# Patient Record
Sex: FEMALE | Race: WHITE | NOT HISPANIC OR LATINO | URBAN - METROPOLITAN AREA
[De-identification: names, ages, dates, MRNs, and addresses within clinical notes are randomized per-mention and may not be internally consistent; named-entity substitution may affect disease eponyms.]

---

## 2023-02-22 ENCOUNTER — TELEPHONE (OUTPATIENT)
Dept: OBGYN CLINIC | Facility: CLINIC | Age: 28
End: 2023-02-22

## 2023-02-22 NOTE — TELEPHONE ENCOUNTER
Pt called and said she is about 19-20 weeks and wants to establish care with us  Pt moved from [facility]  Said she had care one time prior  She would like a call back

## 2023-03-01 NOTE — PROGRESS NOTES
OB/GYN  PRENATAL H&P VISIT (#1 appointment; transfer)  Lucy Herman  3/6/2023  11:07 AM      SUBJECTIVE  Patient is a  at [address] (by [address] [facility]) here for initial prenatal H&P  This pregnancy was not intended but now, desired pregnancy  She is currently doing well  She denies vaginal bleeding, cramping, leakage, abnormal discharge  She is a SAHM  Relevant PMHX: 3rd degree vaginal laceration, obesity  Relevant PSHX: LTCS in 2019; failure to progress    She denies hx of STIs including herpes  She denies denies a hx of TB or close contacts with persons with TB  She has not had MRSA  She denies a personal or family history of inheritable conditions such as physical or intellectual disabilities, birth defects, clotting disorders, heart or neural tube defects  She is planning traveling next week to [place]  Reviewed movement on plane and risk of blood clotting  She denies use of nicotine or recreational drug use  She denies use of ETOH  OB History    Para Term  AB Living   3 2 2 0 0 2   SAB IAB Ectopic Multiple Live Births   0 0 0 0 2      # Outcome Date GA Lbr Matteo/2nd Weight Sex Delivery Anes PTL Lv   3 Current            2 Term  41w3d  4252 g (9 lb 6 oz) F CS-LTranv   TIRSO      Complications: Failure to Progress in Second Stage   1 Term  40w5d  4394 g (9 lb 11 oz) M Vag-Spont EPI  TIRSO       Review of Systems    History reviewed  No pertinent past medical history      Past Surgical History:   Procedure Laterality Date   •  SECTION         Social History     Socioeconomic History   • Marital status: /Civil Union     Spouse name: Not on file   • Number of children: Not on file   • Years of education: Not on file   • Highest education level: Not on file   Occupational History   • Not on file   Tobacco Use   • Smoking status: Never   • Smokeless tobacco: Never   Vaping Use   • Vaping Use: Never used   Substance and Sexual Activity   • Alcohol use: Not Currently   • Drug use: Never   • Sexual activity: Yes     Partners: Male     Birth control/protection: None   Other Topics Concern   • Not on file   Social History Narrative   • Not on file     Social Determinants of Health     Financial Resource Strain: Not on file   Food Insecurity: Not on file   Transportation Needs: Not on file   Physical Activity: Not on file   Stress: Not on file   Social Connections: Not on file   Intimate Partner Violence: Not on file   Housing Stability: Not on file       OBJECTIVE  Vitals:    23 1000   BP: 130/82     Physical Exam  Constitutional:       General: She is not in acute distress  Appearance: Normal appearance  HENT:      Head: Normocephalic  Eyes:      Conjunctiva/sclera: Conjunctivae normal    Cardiovascular:      Rate and Rhythm: Normal rate and regular rhythm  Heart sounds: Normal heart sounds  Pulmonary:      Effort: Pulmonary effort is normal       Breath sounds: Normal breath sounds  Abdominal:      General: Abdomen is flat  Palpations: Abdomen is soft  Tenderness: There is no right CVA tenderness or left CVA tenderness  Musculoskeletal:         General: Normal range of motion  Cervical back: Normal range of motion  Lymphadenopathy:      Cervical: No cervical adenopathy  Neurological:      Mental Status: She is alert and oriented to person, place, and time  Skin:     General: Skin is warm and dry  Psychiatric:         Mood and Affect: Mood normal          Behavior: Behavior normal          Thought Content: Thought content normal    Vitals and nursing note reviewed  ASSESSMENT AND PLAN    28 y o , , with /82 (BP Location: Left arm, Cuff Size: Standard)   Ht 5' 7" (1 702 m)   Wt 93 9 kg (207 lb) , at 21w2d here for her prenatal H&P   by doppler  Fundal height 21 cm  SH noted on dating US on 23; denies vaginal bleeding and cramping    She declined/refused pelvic exam  Pap is UTD from , no hx of abnormal paps  She refused/declined STI testing, including testing off the urine  • H&P completed today  Notable for large babies at term deliveries; 9lbs 6oz and 9lbs 11oz  Hx of LTCS in 2019 with successful  in ; 3rd degree tear  She desires another vaginal delivery  History of blood transfusion due to vaginal tear with last delivery  • HERN[facility]N 07/15/23 by [address] at [facility]  • PN Labs ordered today including prenatal panel, Hep C, HIV  • Reviewed sickle cell, SMA, CF carrier screening  She is checking with insurance coverage prior to completion  • MFM referral placed for genetic testing and ultrasound PRN  • Patient's BMI is 32  • Precautions regarding labor, leakage, bleeding, and fetal movement reviewed  • Return to the office in 4 weeks       GUS Lynch  3/6/2023  11:07 AM

## 2023-03-06 ENCOUNTER — INITIAL PRENATAL (OUTPATIENT)
Dept: OBGYN CLINIC | Facility: CLINIC | Age: 28
End: 2023-03-06

## 2023-03-06 VITALS
DIASTOLIC BLOOD PRESSURE: 82 MMHG | WEIGHT: 207 LBS | BODY MASS INDEX: 32.49 KG/M2 | SYSTOLIC BLOOD PRESSURE: 130 MMHG | HEIGHT: 67 IN

## 2023-03-06 DIAGNOSIS — Z3A.21 21 WEEKS GESTATION OF PREGNANCY: Primary | ICD-10-CM

## 2023-03-06 PROBLEM — Z98.891 HISTORY OF C-SECTION: Status: ACTIVE | Noted: 2022-12-14

## 2023-03-06 PROBLEM — O09.291: Status: ACTIVE | Noted: 2022-12-14

## 2023-03-06 RX ORDER — COLOSTRUM, BOVINE 400 MG
250 CAPSULE,DELAYED RELEASE (ENTERIC COATED) ORAL
COMMUNITY

## 2023-03-10 ENCOUNTER — ROUTINE PRENATAL (OUTPATIENT)
Facility: HOSPITAL | Age: 28
End: 2023-03-10

## 2023-03-10 VITALS
WEIGHT: 207.8 LBS | SYSTOLIC BLOOD PRESSURE: 112 MMHG | HEART RATE: 98 BPM | BODY MASS INDEX: 32.62 KG/M2 | HEIGHT: 67 IN | DIASTOLIC BLOOD PRESSURE: 66 MMHG

## 2023-03-10 DIAGNOSIS — Z98.891 HISTORY OF CESAREAN SECTION: ICD-10-CM

## 2023-03-10 DIAGNOSIS — O99.212 MATERNAL OBESITY, ANTEPARTUM, SECOND TRIMESTER: Primary | ICD-10-CM

## 2023-03-10 DIAGNOSIS — Z3A.21 21 WEEKS GESTATION OF PREGNANCY: ICD-10-CM

## 2023-03-10 NOTE — LETTER
March 10, 2023     [address]    Patient: Carlin Solitario   YOB: 1995   Date of Visit: 3/10/2023       Dear Dr Dominica Cockayne: Thank you for referring Carlin Solitario to me for evaluation  Below are my notes for this consultation  If you have questions, please do not hesitate to call me  I look forward to following your patient along with you           Sincerely,        Meaghan Loving MD        CC: No Recipients  Meaghan Loving MD  3/10/2023  1:07 PM  Sign when Signing Visit  Please refer to the [facility] ultrasound report in Ob Procedures for additional information regarding today's visit

## 2023-03-10 NOTE — PROGRESS NOTES
Ultrasound Probe Disinfection    A transvaginal ultrasound was performed  Prior to use, disinfection was performed with High Level Disinfection Process (Trophon)  Probe serial number A4: [id number] was used        Eder Jackson  03/10/23  3:01 PM

## 2023-03-28 ENCOUNTER — ROUTINE PRENATAL (OUTPATIENT)
Dept: OBGYN CLINIC | Facility: CLINIC | Age: 28
End: 2023-03-28

## 2023-03-28 VITALS
SYSTOLIC BLOOD PRESSURE: 114 MMHG | WEIGHT: 210.4 LBS | HEIGHT: 67 IN | DIASTOLIC BLOOD PRESSURE: 72 MMHG | BODY MASS INDEX: 33.02 KG/M2

## 2023-03-28 DIAGNOSIS — Z34.82 ENCOUNTER FOR SUPERVISION OF NORMAL PREGNANCY IN MULTIGRAVIDA IN SECOND TRIMESTER: Primary | ICD-10-CM

## 2023-03-28 PROBLEM — Z92.89 HISTORY OF BLOOD TRANSFUSION: Status: ACTIVE | Noted: 2022-12-14

## 2023-03-28 NOTE — PROGRESS NOTES
VISIT: Denies n/v/HA/cramping/vb/lof/edema/dv/smoking; urine neg/neg  Initial ob labs not done - does not desire STI screening because knows will be negative and concerned about insurance coverage - reviewed if not done, baby will be screened after delivery - will plan to get done but declines genetic carrier screening/thyroid studies; 3/10/23 - level 2 ultrasound done - follow-up growth and anatomy at 32 weeks  PNVs + DHA - tolerating daily  Good FM     Reprinted initial ob labs and included/reviewed 1 hour GTT to get done btw 26-28 weeks  Encourage handwashing and infection prevention  RTO in 4 weeks for routine ob check or sooner if needed

## 2023-04-24 ENCOUNTER — TELEPHONE (OUTPATIENT)
Dept: OBGYN CLINIC | Facility: CLINIC | Age: 28
End: 2023-04-24

## 2023-04-24 ENCOUNTER — APPOINTMENT (OUTPATIENT)
Dept: LAB | Facility: CLINIC | Age: 28
End: 2023-04-24

## 2023-04-24 DIAGNOSIS — Z34.82 ENCOUNTER FOR SUPERVISION OF NORMAL PREGNANCY IN MULTIGRAVIDA IN SECOND TRIMESTER: ICD-10-CM

## 2023-04-24 LAB
ABO GROUP BLD: NORMAL
BACTERIA UR QL AUTO: NORMAL /HPF
BASOPHILS # BLD AUTO: 0.02 THOUSANDS/ΜL (ref 0–0.1)
BASOPHILS NFR BLD AUTO: 0 % (ref 0–1)
BILIRUB UR QL STRIP: NEGATIVE
BLD GP AB SCN SERPL QL: NEGATIVE
CLARITY UR: CLEAR
COLOR UR: COLORLESS
EOSINOPHIL # BLD AUTO: 0.08 THOUSAND/ΜL (ref 0–0.61)
EOSINOPHIL NFR BLD AUTO: 1 % (ref 0–6)
ERYTHROCYTE [DISTWIDTH] IN BLOOD BY AUTOMATED COUNT: 12.4 % (ref 11.6–15.1)
GLUCOSE 1H P 50 G GLC PO SERPL-MCNC: 99 MG/DL (ref 40–134)
GLUCOSE UR STRIP-MCNC: NEGATIVE MG/DL
HCT VFR BLD AUTO: 38.1 % (ref 34.8–46.1)
HGB BLD-MCNC: 12.3 G/DL (ref 11.5–15.4)
HGB UR QL STRIP.AUTO: NEGATIVE
HIV 1+2 AB+HIV1 P24 AG SERPL QL IA: NORMAL
HIV 2 AB SERPL QL IA: NORMAL
HIV1 AB SERPL QL IA: NORMAL
HIV1 P24 AG SERPL QL IA: NORMAL
IMM GRANULOCYTES # BLD AUTO: 0.08 THOUSAND/UL (ref 0–0.2)
IMM GRANULOCYTES NFR BLD AUTO: 1 % (ref 0–2)
KETONES UR STRIP-MCNC: NEGATIVE MG/DL
LEUKOCYTE ESTERASE UR QL STRIP: NEGATIVE
LYMPHOCYTES # BLD AUTO: 1.74 THOUSANDS/ΜL (ref 0.6–4.47)
LYMPHOCYTES NFR BLD AUTO: 17 % (ref 14–44)
MCH RBC QN AUTO: 30.6 PG (ref 26.8–34.3)
MCHC RBC AUTO-ENTMCNC: 32.3 G/DL (ref 31.4–37.4)
MCV RBC AUTO: 95 FL (ref 82–98)
MONOCYTES # BLD AUTO: 0.66 THOUSAND/ΜL (ref 0.17–1.22)
MONOCYTES NFR BLD AUTO: 6 % (ref 4–12)
NEUTROPHILS # BLD AUTO: 7.93 THOUSANDS/ΜL (ref 1.85–7.62)
NEUTS SEG NFR BLD AUTO: 75 % (ref 43–75)
NITRITE UR QL STRIP: NEGATIVE
NON-SQ EPI CELLS URNS QL MICRO: NORMAL /HPF
NRBC BLD AUTO-RTO: 0 /100 WBCS
PH UR STRIP.AUTO: 7 [PH]
PLATELET # BLD AUTO: 202 THOUSANDS/UL (ref 149–390)
PMV BLD AUTO: 11.4 FL (ref 8.9–12.7)
PROT UR STRIP-MCNC: NEGATIVE MG/DL
RBC # BLD AUTO: 4.02 MILLION/UL (ref 3.81–5.12)
RBC #/AREA URNS AUTO: NORMAL /HPF
RH BLD: POSITIVE
RUBV IGG SERPL IA-ACNC: 57.1 IU/ML
SP GR UR STRIP.AUTO: 1.01 (ref 1–1.03)
TREPONEMA PALLIDUM IGG+IGM AB [PRESENCE] IN SERUM OR PLASMA BY IMMUNOASSAY: NORMAL
UROBILINOGEN UR STRIP-ACNC: <2 MG/DL
VZV IGG SER QL IA: ABNORMAL
WBC # BLD AUTO: 10.51 THOUSAND/UL (ref 4.31–10.16)
WBC #/AREA URNS AUTO: NORMAL /HPF

## 2023-04-24 NOTE — TELEPHONE ENCOUNTER
Pt cancelled her appt tomorrow and said she will wait till her next appt on 5/9  Pt says she is going out of town  I offered appt today at 4:30 with Dr Fatemeh Humphrey and pt refused

## 2023-04-25 LAB
HBV SURFACE AG SER QL: NORMAL
HCV AB SER QL: NORMAL

## 2023-04-26 DIAGNOSIS — R82.71 ASYMPTOMATIC BACTERIURIA DURING PREGNANCY IN THIRD TRIMESTER: Primary | ICD-10-CM

## 2023-04-26 DIAGNOSIS — O99.891 ASYMPTOMATIC BACTERIURIA DURING PREGNANCY IN THIRD TRIMESTER: Primary | ICD-10-CM

## 2023-04-26 LAB — BACTERIA UR CULT: ABNORMAL

## 2023-04-26 RX ORDER — NITROFURANTOIN 25; 75 MG/1; MG/1
100 CAPSULE ORAL 2 TIMES DAILY
Qty: 10 CAPSULE | Refills: 0 | Status: SHIPPED | OUTPATIENT
Start: 2023-04-26 | End: 2023-05-01

## 2023-04-27 LAB
HGB A MFR BLD: 2.7 % (ref 1.8–3.2)
HGB A MFR BLD: 97.3 % (ref 96.4–98.8)
HGB F MFR BLD: 0 % (ref 0–2)
HGB FRACT BLD-IMP: NORMAL
HGB S MFR BLD: 0 %

## 2023-05-08 ENCOUNTER — ROUTINE PRENATAL (OUTPATIENT)
Dept: OBGYN CLINIC | Facility: CLINIC | Age: 28
End: 2023-05-08

## 2023-05-08 VITALS — WEIGHT: 221 LBS | DIASTOLIC BLOOD PRESSURE: 74 MMHG | SYSTOLIC BLOOD PRESSURE: 116 MMHG | BODY MASS INDEX: 34.61 KG/M2

## 2023-05-08 DIAGNOSIS — O34.219 HX SUCCESSFUL VBAC (VAGINAL BIRTH AFTER CESAREAN), CURRENTLY PREGNANT: ICD-10-CM

## 2023-05-08 DIAGNOSIS — O09.293 HX OF MATERNAL LACERATION, 3RD DEGREE, CURRENTLY PREGNANT, THIRD TRIMESTER: Primary | ICD-10-CM

## 2023-05-08 DIAGNOSIS — O34.219 HISTORY OF CESAREAN DELIVERY, CURRENTLY PREGNANT: ICD-10-CM

## 2023-05-08 DIAGNOSIS — Z3A.30 30 WEEKS GESTATION OF PREGNANCY: ICD-10-CM

## 2023-05-08 NOTE — PROGRESS NOTES
33 y o  [id number] female at 30w2d (Estimated Date of Delivery: 7/15/23) for PNV  Pre- Vitals    Flowsheet Row Most Recent Value   Prenatal Assessment    Fetal Heart Rate 145   Movement Present   Prenatal Vitals    Blood Pressure 116/74   Weight - Scale 100 kg (221 lb)   Urine Albumin/Glucose    Dilation/Effacement/Station    Vaginal Drainage    Edema         TWG: Not found  OB Transfer at 30 weeks  Prior obstetric care initially at [facility] and then through [address] [facility]  Prior deliveries occurred in [place], reviewed obstetric history with patient   - prior 1LTCS due to prolonged labor course and failure to progress in first stage  Reports was 10 days past due    - successful  achieved   weighed 9lb 11oz at birth and 3rd degree perineal laceration with delivery  Delivery occurred at 40 3wks   - both prior pregnancies labor started by IOL  Needed cervical ripening for both labor courses  Ida expressed her desire for  and states that she is looking for a practice that is more supportive of her wishes to still go forward with a vaginal delivery  We discussed the larger size of her babies at term and her hx of 3rd degree perineal laceration  We reviewed that she is at increased risk of a 3rd degree occurring with delivery again and we discussed the option for an IOL at 39 weeks to help reduce her risk in the hopes that her baby may be of slightly smaller size than her prior 2 children who delivered past their HERNÁN  With her hx of successful  she does have increased chance of successfully achieving another  with this pregnancy  28 wk labs reviewed  - GTT 99, Hgb 12 3, plts 202  Red folder given and reviewed  Discussed Fetal kick counts, PTL/Labor information, Baby & Me Classes  Consent signed - ok with transfusion, full code  Current visitor policy and support people during labor reviewed  Patient plans to breastfeed     Skin to skin, rooming in, delayed cord clamp, pain management in labor discussed  TDAP was offered and deferred by the patient to next visit  Rhogam was not indicated  Leakage of fluid: no  Vaginal bleeding: no  Contractions/Cramping: no  Fetal movement: yes    RTO in 2 weeks

## 2023-05-08 NOTE — PROGRESS NOTES
Pt is here for her 30w prenatal as a new pt  Pt denies any swelling,leakage,bleeding,pressure, or contractions  Pt would like to discuss her tdap and get it next visit if decided  Breast pump ordered  Pt is looking to have v-alma for this pregnancy   Urine dip neg/neg

## 2023-05-08 NOTE — PATIENT INSTRUCTIONS
Kick Counts in Pregnancy   WHAT YOU NEED TO KNOW:   Kick counts measure how much your baby is moving in your womb  A kick from your baby can be felt as a twist, turn, swish, roll, or jab  It is common to feel your baby kicking at 26 to 28 weeks of pregnancy  You may feel your baby kick as early as 20 weeks of pregnancy  You may want to start counting at 28 weeks  DISCHARGE INSTRUCTIONS:   Contact your doctor immediately if:   You feel a change in the number of kicks or movements of your baby  You feel fewer than 10 kicks within 2 hours  You have questions or concerns about your baby's movements  Why measure kick counts:  Your baby's movement may provide information about your baby's health  He or she may move less, or not at all, if there are problems  Your baby may move less if he or she is not getting enough oxygen or nutrition from the placenta  Do not smoke while you are pregnant  Smoking decreases the amount of oxygen that gets to your baby  Talk to your healthcare provider if you need help to quit smoking  Tell your healthcare provider as soon as you feel a change in your baby's movements  When to measure kick counts:   Measure kick counts at the same time every day  Measure kick counts when your baby is awake and most active  Your baby may be most active in the evening  How to measure kick counts:  Check that your baby is awake before you measure kick counts  You can wake up your baby by lightly pushing on your belly, walking, or drinking something cold  Your healthcare provider may tell you different ways to measure kick counts  You may be told to do the following:  Use a chart or clock to keep track of the time you start and finish counting  Sit in a chair or lie on your left side  Place your hands on the largest part of your belly  Count until you reach 10 kicks  Write down how much time it takes to count 10 kicks  It may take 30 minutes to 2 hours to count 10 kicks  It should not take more than 2 hours to count 10 kicks  Follow up with your doctor as directed:  Write down your questions so you remember to ask them during your visits  © Copyright [employer] 2022 Information is for End User's use only and may not be sold, redistributed or otherwise used for commercial purposes  The above information is an  only  It is not intended as medical advice for individual conditions or treatments  Talk to your doctor, nurse or pharmacist before following any medical regimen to see if it is safe and effective for you

## 2023-05-09 LAB
DME PARACHUTE DELIVERY DATE REQUESTED: NORMAL
DME PARACHUTE ITEM DESCRIPTION: NORMAL
DME PARACHUTE ORDER STATUS: NORMAL
DME PARACHUTE SUPPLIER NAME: NORMAL
DME PARACHUTE SUPPLIER PHONE: NORMAL

## 2023-05-23 ENCOUNTER — ROUTINE PRENATAL (OUTPATIENT)
Dept: OBGYN CLINIC | Facility: CLINIC | Age: 28
End: 2023-05-23

## 2023-05-23 VITALS
WEIGHT: 221 LBS | SYSTOLIC BLOOD PRESSURE: 99 MMHG | DIASTOLIC BLOOD PRESSURE: 58 MMHG | HEIGHT: 67 IN | BODY MASS INDEX: 34.69 KG/M2

## 2023-05-23 DIAGNOSIS — O09.293 HX OF MATERNAL LACERATION, 3RD DEGREE, CURRENTLY PREGNANT, THIRD TRIMESTER: ICD-10-CM

## 2023-05-23 DIAGNOSIS — O34.219 HISTORY OF CESAREAN DELIVERY, CURRENTLY PREGNANT: ICD-10-CM

## 2023-05-23 DIAGNOSIS — Z34.93 PRENATAL CARE IN THIRD TRIMESTER: Primary | ICD-10-CM

## 2023-05-23 NOTE — PROGRESS NOTES
Patient reports good fm, no n/v, headache, cramping, bleeding, loss of fluid, edema, dom violence, or smoking  elsa pnv deciding brand of breast pump, already has hospital paperwork  Would like tdap next visit  Urine neg/neg return in 2 weeks or sooner as needed

## 2023-06-08 ENCOUNTER — ROUTINE PRENATAL (OUTPATIENT)
Dept: OBGYN CLINIC | Facility: CLINIC | Age: 28
End: 2023-06-08
Payer: COMMERCIAL

## 2023-06-08 VITALS — BODY MASS INDEX: 35.87 KG/M2 | DIASTOLIC BLOOD PRESSURE: 72 MMHG | SYSTOLIC BLOOD PRESSURE: 108 MMHG | WEIGHT: 229 LBS

## 2023-06-08 DIAGNOSIS — Z23 NEED FOR TDAP VACCINATION: ICD-10-CM

## 2023-06-08 DIAGNOSIS — Z34.83 ENCOUNTER FOR SUPERVISION OF NORMAL PREGNANCY IN MULTIGRAVIDA IN THIRD TRIMESTER: Primary | ICD-10-CM

## 2023-06-08 PROCEDURE — 90715 TDAP VACCINE 7 YRS/> IM: CPT | Performed by: PHYSICIAN ASSISTANT

## 2023-06-08 PROCEDURE — PNV: Performed by: PHYSICIAN ASSISTANT

## 2023-06-08 PROCEDURE — 90471 IMMUNIZATION ADMIN: CPT | Performed by: PHYSICIAN ASSISTANT

## 2023-06-08 NOTE — PROGRESS NOTES
VISIT: (+) edema - a little in legs when active during the day - notices at night and needs to elevate feet; Denies n/v/HA/cramping/vb/lof/dv/smoking; urine neg/neg  Labs are up to date; Third trimester growth and anatomy scan - not getting done due to cost  PNVs + DHA - not taking - trying to get through diet  Good FM - r/gerson 10 kicks/2 hrs  Tdap - reviewed and encouraged - administered today without issue; Aware GBS at next visit; Desires another  - both prior pregnancies required induction and process was very long - felt like she was scared into moving forward with C/S for first pregnancy; had successful  second pregnancy but was 10 days late and induction process took very long  Desires another  despite the long process    Contraception: plans condoms until ready to have Paragard IUD placed  RTO in 2 weeks for routine ob check or sooner if needed

## 2023-06-20 NOTE — PROGRESS NOTES
OB/GYN  PN Visit  Tho Calderon  [id number]  2023  1:13 PM  Dr Owen Alas MD    S: 33 y o  [id number] 36w4d here for PN visit  She denies contractions  She denies leakage of fluid and vaginal bleeding  She reports good fetal movement  She denies nausea, vomiting, headache, cramping, domestic violence, and smoking  She reports edema  Her pregnancy is complicated by Hx C/S with successful  and varicella NI      O:  Pre-Tasneem Vitals    Flowsheet Row Most Recent Value   Prenatal Assessment    Fetal Heart Rate 130s   Fundal Height (cm) 36 cm   Movement Present   Presentation Vertex   Prenatal Vitals    Blood Pressure 116/72   Weight - Scale 107 kg (235 lb)   Urine Albumin/Glucose    Dilation/Effacement/Station    Vaginal Drainage    Draining Fluid No   Edema    LLE Edema Trace   RLE Edema Trace        Physical Exam  Vitals reviewed  Constitutional:       General: She is not in acute distress  Appearance: Normal appearance  She is well-developed  She is not ill-appearing, toxic-appearing or diaphoretic  Cardiovascular:      Rate and Rhythm: Normal rate  Pulmonary:      Effort: Pulmonary effort is normal  No respiratory distress  Abdominal:      General: There is no distension  Palpations: Abdomen is soft  There is no mass  Tenderness: There is no abdominal tenderness  There is no guarding or rebound  Genitourinary:     Comments: Gravid, nontender  Skin:     General: Skin is warm and dry  Neurological:      Mental Status: She is alert and oriented to person, place, and time     Psychiatric:         Mood and Affect: Mood normal          Behavior: Behavior normal          A/P:    Problem List        Unprioritized    Obesity in pregnancy    Overview     BMI 33 --> 36         History of  delivery, currently pregnant    Overview     CS in 2019 for failure to progress in first stage followed by  in 6097 complicated by 3rd degree laceration  Patient requests TOLAC Hx of maternal laceration, 3rd degree, currently pregnant, third trimester    Overview     Patient has opted for TOLAC         History of blood transfusion    Overview     Pt had blood transfusion with both deliveries  Never had heme work-up    Recommend T&C x2 U on admission and plan for hemorrhage kit in room with aggressive use of tocolytics         Hx successful  (vaginal birth after ), currently pregnant    Overview     Patient hoping for  again  Patient requests IOL at 40 weeks         36 weeks gestation of pregnancy    Current Assessment & Plan     - Continue PNV  - Labor precautions reviewed  - Fetal kick counts reviewed  - Labs: UTD  - Ultrasounds: Most recent US on 3/10/23 wnl (breech presentation); VTX on ultrasound today  - Tdap: Administered 23  - Flu Shot: Will offer in season  - COVID: Vaccinated  - Delivery: TOLAC, considering epidural  - Contraception: Condoms as bridge to Paragard  - Breastfeeding: Yes  - GBS collected  - RTO in 1 week              Future Appointments   Date Time Provider León [place]manisti   2023  3:30 PM Vicky Thomas MD [address]   2023  2:00 PM Rachelle Sandoval MD [address]   2023  3:30 PM Fili Mensah MD [address]   2023 10:30 AM Nhung Landeros PA-C [id number] Shirley Tovar MD  2023  1:13 PM

## 2023-06-21 ENCOUNTER — ROUTINE PRENATAL (OUTPATIENT)
Dept: OBGYN CLINIC | Facility: CLINIC | Age: 28
End: 2023-06-21

## 2023-06-21 VITALS — DIASTOLIC BLOOD PRESSURE: 72 MMHG | BODY MASS INDEX: 36.81 KG/M2 | SYSTOLIC BLOOD PRESSURE: 116 MMHG | WEIGHT: 235 LBS

## 2023-06-21 DIAGNOSIS — O09.293 HX OF MATERNAL LACERATION, 3RD DEGREE, CURRENTLY PREGNANT, THIRD TRIMESTER: Primary | ICD-10-CM

## 2023-06-21 DIAGNOSIS — O99.210 OBESITY IN PREGNANCY: ICD-10-CM

## 2023-06-21 DIAGNOSIS — Z3A.36 36 WEEKS GESTATION OF PREGNANCY: ICD-10-CM

## 2023-06-21 DIAGNOSIS — O34.219 HX SUCCESSFUL VBAC (VAGINAL BIRTH AFTER CESAREAN), CURRENTLY PREGNANT: ICD-10-CM

## 2023-06-21 DIAGNOSIS — Z92.89 HISTORY OF BLOOD TRANSFUSION: ICD-10-CM

## 2023-06-21 DIAGNOSIS — O34.219 HISTORY OF CESAREAN DELIVERY, CURRENTLY PREGNANT: ICD-10-CM

## 2023-06-21 PROCEDURE — PNV: Performed by: OBSTETRICS & GYNECOLOGY

## 2023-06-21 PROCEDURE — 87150 DNA/RNA AMPLIFIED PROBE: CPT | Performed by: OBSTETRICS & GYNECOLOGY

## 2023-06-21 NOTE — ASSESSMENT & PLAN NOTE
- Continue PNV  - Labor precautions reviewed  - Fetal kick counts reviewed  - Labs: UTD  - Ultrasounds: Most recent US on 3/10/23 wnl (breech presentation); VTX on ultrasound today  - Tdap: Administered 6/8/23  - Flu Shot: Will offer in season  - COVID: Vaccinated  - Delivery: TOLAC, considering epidural  - Contraception: Condoms as bridge to Paragard  - Breastfeeding: Yes  - GBS collected  - RTO in 1 week

## 2023-06-23 LAB — GP B STREP DNA SPEC QL NAA+PROBE: NEGATIVE

## 2023-06-30 ENCOUNTER — ROUTINE PRENATAL (OUTPATIENT)
Dept: OBGYN CLINIC | Facility: CLINIC | Age: 28
End: 2023-06-30

## 2023-06-30 VITALS
WEIGHT: 240 LBS | HEIGHT: 67 IN | SYSTOLIC BLOOD PRESSURE: 122 MMHG | BODY MASS INDEX: 37.67 KG/M2 | DIASTOLIC BLOOD PRESSURE: 80 MMHG

## 2023-06-30 DIAGNOSIS — Z3A.37 37 WEEKS GESTATION OF PREGNANCY: Primary | ICD-10-CM

## 2023-06-30 DIAGNOSIS — O34.219 HX SUCCESSFUL VBAC (VAGINAL BIRTH AFTER CESAREAN), CURRENTLY PREGNANT: ICD-10-CM

## 2023-06-30 DIAGNOSIS — O99.210 OBESITY IN PREGNANCY: ICD-10-CM

## 2023-06-30 DIAGNOSIS — O09.293 HX OF MATERNAL LACERATION, 3RD DEGREE, CURRENTLY PREGNANT, THIRD TRIMESTER: ICD-10-CM

## 2023-06-30 DIAGNOSIS — O34.219 HISTORY OF CESAREAN DELIVERY, CURRENTLY PREGNANT: ICD-10-CM

## 2023-06-30 PROCEDURE — PNV: Performed by: STUDENT IN AN ORGANIZED HEALTH CARE EDUCATION/TRAINING PROGRAM

## 2023-06-30 NOTE — PROGRESS NOTES
Juli Barragan is a 33 y o   37w6d  Reports ++FM, no LOF, VB, or regular contractions       Vitals:    23 1500   BP: 122/80   S=D  +FHTs    A/P:  Third tri labs wnl, GBS negative  Rh status POS  TDAP vaccine--received  Contraception: condoms--> ParaGard IUD  Breastfeeding: yes, has pump, working on a new one too  Birth plan: hx of CS-->  (3rd degree), desires TOLAC  If by HERNÁN no labor, would be amenable to induction    Discussed term labor precautions  Return to office in 1 week

## 2023-07-07 ENCOUNTER — ROUTINE PRENATAL (OUTPATIENT)
Dept: OBGYN CLINIC | Facility: CLINIC | Age: 28
End: 2023-07-07

## 2023-07-07 VITALS
DIASTOLIC BLOOD PRESSURE: 78 MMHG | BODY MASS INDEX: 38.39 KG/M2 | SYSTOLIC BLOOD PRESSURE: 122 MMHG | HEIGHT: 67 IN | WEIGHT: 244.6 LBS

## 2023-07-07 DIAGNOSIS — O09.293 HX OF MATERNAL LACERATION, 3RD DEGREE, CURRENTLY PREGNANT, THIRD TRIMESTER: ICD-10-CM

## 2023-07-07 DIAGNOSIS — O34.219 HX SUCCESSFUL VBAC (VAGINAL BIRTH AFTER CESAREAN), CURRENTLY PREGNANT: Primary | ICD-10-CM

## 2023-07-07 DIAGNOSIS — O99.210 OBESITY IN PREGNANCY: ICD-10-CM

## 2023-07-07 DIAGNOSIS — O34.219 HISTORY OF CESAREAN DELIVERY, CURRENTLY PREGNANT: ICD-10-CM

## 2023-07-07 DIAGNOSIS — Z3A.38 38 WEEKS GESTATION OF PREGNANCY: ICD-10-CM

## 2023-07-07 PROCEDURE — PNV: Performed by: STUDENT IN AN ORGANIZED HEALTH CARE EDUCATION/TRAINING PROGRAM

## 2023-07-07 NOTE — PROGRESS NOTES
Epifanio Common is a 34 yo  at 38w6d presenting for routine PNC- denies any CTX, LOF or VB,. Endorses regular FM. Declines cervical exam today. Amendable to being set up for IOL and membrane stripping at next visit if not in labor by then. Desires TOLAC- aware of risk with successful  of a 9lb 11oz baby! Fells this baby is smaller. Labor precautions reviewed- RTO in 1 week.

## 2023-07-07 NOTE — PATIENT INSTRUCTIONS
Pregnancy at 28 to 38 Weeks   AMBULATORY CARE:   Changes happening with your body: You are considered full term at the beginning of 37 weeks. Your breathing may be easier if your baby has moved down into a head-down position. You may need to urinate more often because the baby may be pressing on your bladder. You may also feel more discomfort and get tired easily. Seek care immediately if:   You develop a severe headache that does not go away. You have new or increased vision changes, such as blurred or spotted vision. You have new or increased swelling in your face or hands. You have vaginal spotting or bleeding. Your water broke or you feel warm water gushing or trickling from your vagina. Call your obstetrician if:   You have more than 5 contractions in 1 hour. You notice any changes in your baby's movements. You have abdominal cramps, pressure, or tightening. You have a change in vaginal discharge. You have chills or a fever. You have vaginal itching, burning, or pain. You have yellow, green, white, or foul-smelling vaginal discharge. You have pain or burning when you urinate, less urine than usual, or pink or bloody urine. You have questions or concerns about your condition or care. How to care for yourself at this stage of your pregnancy:       Eat a variety of healthy foods. Healthy foods include fruits, vegetables, whole-grain breads, low-fat dairy foods, beans, lean meats, and fish. Drink liquids as directed. Ask how much liquid to drink each day and which liquids are best for you. Limit caffeine to less than 200 milligrams each day. Limit your intake of fish to 2 servings each week. Choose fish low in mercury such as canned light tuna, shrimp, salmon, cod, or tilapia. Do not  eat fish high in mercury such as swordfish, tilefish, [place] mackerel, and shark. Take prenatal vitamins as directed.   Your need for certain vitamins and minerals, such as folic acid, increases during pregnancy. Prenatal vitamins provide some of the extra vitamins and minerals you need. Prenatal vitamins may also help to decrease the risk of certain birth defects. Rest as needed. Put your feet up if you have swelling in your ankles and feet. Talk to your healthcare provider about exercise. Moderate exercise can help you stay fit. Your healthcare provider will help you plan an exercise program that is safe for you during pregnancy. Do not smoke. Smoking increases your risk of a miscarriage and other health problems during your pregnancy. Smoking can cause your baby to be born early or weigh less at birth. Ask your healthcare provider for information if you need help quitting. Do not drink alcohol. Alcohol passes from your body to your baby through the placenta. It can affect your baby's brain development and cause fetal alcohol syndrome (FAS). FAS is a group of conditions that causes mental, behavior, and growth problems. Talk to your healthcare provider before you take any medicines. Many medicines may harm your baby if you take them when you are pregnant. Do not take any medicines, vitamins, herbs, or supplements without first talking to your healthcare provider. Never use illegal or street drugs (such as marijuana or cocaine) while you are pregnant. Safety tips during pregnancy:   Avoid hot tubs and saunas. Do not use a hot tub or sauna while you are pregnant, especially during your first trimester. Hot tubs and saunas may raise your baby's temperature and increase the risk of birth defects. Avoid toxoplasmosis. This is an infection caused by eating raw meat or being around infected cat feces. It can cause birth defects, miscarriages, and other problems. Wash your hands after you touch raw meat. Make sure any meat is well-cooked before you eat it. Avoid raw eggs and unpasteurized milk.  Use gloves or ask someone else to clean your cat's litter box while you are pregnant. Ask your healthcare provider about travel. The most comfortable time to travel is during the second trimester. Ask your provider if you can travel after 36 weeks. You may not be able to travel in an airplane after 36 weeks. He or she may also recommend you avoid long road trips. Changes happening with your baby:  By 38 weeks, your baby may weigh between 6 and 9 pounds. Your baby may be about 14 inches long from the top of the head to the rump (baby's bottom). Your baby hears well enough to know your voice. As your baby gets larger, you may feel fewer kicks and more stretching and rolling. Your baby may move into a head-down position. Your baby will also rest lower in your abdomen. What you need to know about prenatal care: Your healthcare provider will check your blood pressure and weight. You may also need the following:  A urine test  may also be done to check for sugar and protein. These can be signs of gestational diabetes or infection. Protein in your urine may also be a sign of preeclampsia. Preeclampsia is a condition that can develop during week 20 or later of your pregnancy. It causes high blood pressure, and it can cause problems with your kidneys and other organs. A gestational diabetes screen  may be done. Your healthcare provider may order either a 1-step or 2-step oral glucose tolerance test (OGTT). 1-step OGTT:  Your blood sugar level will be tested after you have not eaten for 8 hours (fasting). You will then be given a glucose drink. Your level will be tested again 1 hour and 2 hours after you finish the drink. 2-step OGTT:  You do not have to fast for the first part of the test. You will have the glucose drink at any time of day. Your blood sugar level will be checked 1 hour later. If your blood sugar is higher than a certain level, another test will be ordered. You will fast and your blood sugar level will be tested. You will have the glucose drink. Your blood will be tested again 1 hour, 2 hours, and 3 hours after you finish the glucose drink. A blood test  may be done to check for anemia (low iron level). A Tdap vaccine  may be recommended by your healthcare provider. A group B strep test  is a test that is done to check for group B strep infection. Group B strep is a type of bacteria that may be found in the vagina or rectum. It can be passed to your baby during delivery if you have it. Your healthcare provider will take swab your vagina or rectum and send the sample to the lab for tests. Fundal height  is a measurement of your uterus to check your baby's growth. This number is usually the same as the number of weeks that you have been pregnant. Your healthcare provider may also check your baby's position. Your baby's heart rate  will be checked. Follow up with your obstetrician as directed:  Write down your questions so you remember to ask them during your visits. © Copyright Carlus Rater 2022 Information is for End User's use only and may not be sold, redistributed or otherwise used for commercial purposes. The above information is an  only. It is not intended as medical advice for individual conditions or treatments. Talk to your doctor, nurse or pharmacist before following any medical regimen to see if it is safe and effective for you. Kick Counts in Pregnancy   WHAT YOU NEED TO KNOW:   Kick counts measure how much your baby is moving in your womb. A kick from your baby can be felt as a twist, turn, swish, roll, or jab. It is common to feel your baby kicking at 26 to 28 weeks of pregnancy. You may feel your baby kick as early as 20 weeks of pregnancy. You may want to start counting at 28 weeks. DISCHARGE INSTRUCTIONS:   Contact your doctor immediately if:   You feel a change in the number of kicks or movements of your baby. You feel fewer than 10 kicks within 2 hours.      You have questions or concerns about your baby's movements. Why measure kick counts:  Your baby's movement may provide information about your baby's health. He or she may move less, or not at all, if there are problems. Your baby may move less if he or she is not getting enough oxygen or nutrition from the placenta. Do not smoke while you are pregnant. Smoking decreases the amount of oxygen that gets to your baby. Talk to your healthcare provider if you need help to quit smoking. Tell your healthcare provider as soon as you feel a change in your baby's movements. When to measure kick counts:   Measure kick counts at the same time every day. Measure kick counts when your baby is awake and most active. Your baby may be most active in the evening. How to measure kick counts:  Check that your baby is awake before you measure kick counts. You can wake up your baby by lightly pushing on your belly, walking, or drinking something cold. Your healthcare provider may tell you different ways to measure kick counts. You may be told to do the following:  Use a chart or clock to keep track of the time you start and finish counting. Sit in a chair or lie on your left side. Place your hands on the largest part of your belly. Count until you reach 10 kicks. Write down how much time it takes to count 10 kicks. It may take 30 minutes to 2 hours to count 10 kicks. It should not take more than 2 hours to count 10 kicks. Follow up with your doctor as directed:  Write down your questions so you remember to ask them during your visits. © Copyright [facility] 2022 Information is for End User's use only and may not be sold, redistributed or otherwise used for commercial purposes. The above information is an  only. It is not intended as medical advice for individual conditions or treatments. Talk to your doctor, nurse or pharmacist before following any medical regimen to see if it is safe and effective for you.

## 2023-07-13 NOTE — PROGRESS NOTES
OB/GYN  PN Visit  Qasim Varma  [id number]  2023  4:09 PM  GUS Grajeda    S: 33 y.o. [id number] 39w5d here for PN visit. Pregnancy complicated by hx of LTCS but desires . She had a successful . OB complaints:  Denies c/o n/v/ha,  no smoking, no DV. No vb/lof  No cramping/ctxns or signs of PTL. She reports slight edema of the feet. L is worse than R. She repots occasional contractions. O:    Pre-Tasneem Vitals    Flowsheet Row Most Recent Value   Prenatal Assessment    Fetal Heart Rate 145   Fundal Height (cm) 40 cm   Movement Present   Prenatal Vitals    Blood Pressure 112/78   Weight - Scale 111 kg (243 lb 12.8 oz)   Urine Albumin/Glucose    Dilation/Effacement/Station    Cervical Dilation 1.5   Cervical Effacement 60   Fetal Station -3   Vaginal Drainage    Draining Fluid No   Edema    LLE Edema Trace   RLE Edema Trace            Gen: no acute distress, nonlabored breathing. OB exam completed: fundal height, +FHT. Urine: -/-    A/P:  #1. 39w5d GESTATION  Labor precautions reviewed  Fetal kick counts reviewed  Tdap: 2023  Labs UTD. Consents signed. Breast pump: +  Pediatrician: +  Contraception: condoms to [facility]. GBS: neg  IOL: desires IOL. Clinical team messaged.     RTC in 1 weeks        GUS Grajeda  2023  4:09 PM

## 2023-07-14 ENCOUNTER — ROUTINE PRENATAL (OUTPATIENT)
Dept: OBGYN CLINIC | Facility: CLINIC | Age: 28
End: 2023-07-14

## 2023-07-14 VITALS
SYSTOLIC BLOOD PRESSURE: 112 MMHG | WEIGHT: 243.8 LBS | HEIGHT: 67 IN | BODY MASS INDEX: 38.27 KG/M2 | DIASTOLIC BLOOD PRESSURE: 78 MMHG

## 2023-07-14 DIAGNOSIS — O34.219 HX SUCCESSFUL VBAC (VAGINAL BIRTH AFTER CESAREAN), CURRENTLY PREGNANT: ICD-10-CM

## 2023-07-14 DIAGNOSIS — O99.210 OBESITY IN PREGNANCY: ICD-10-CM

## 2023-07-14 DIAGNOSIS — O34.219 HISTORY OF CESAREAN DELIVERY, CURRENTLY PREGNANT: ICD-10-CM

## 2023-07-14 DIAGNOSIS — O09.293 HX OF MATERNAL LACERATION, 3RD DEGREE, CURRENTLY PREGNANT, THIRD TRIMESTER: ICD-10-CM

## 2023-07-14 DIAGNOSIS — Z3A.39 39 WEEKS GESTATION OF PREGNANCY: Primary | ICD-10-CM

## 2023-07-14 PROCEDURE — PNV

## 2023-07-17 ENCOUNTER — TELEPHONE (OUTPATIENT)
Dept: OBGYN CLINIC | Facility: CLINIC | Age: 28
End: 2023-07-17

## 2023-07-17 NOTE — TELEPHONE ENCOUNTER
Scheduled with GB, 7/21/23 0700. Pt called and made aware, requesting sooner scheduling aware will check with L&D if sooner availability, none at this time. Staff message sent to provider, added to pink sticky and calendar.

## 2023-07-19 ENCOUNTER — ROUTINE PRENATAL (OUTPATIENT)
Dept: OBGYN CLINIC | Facility: CLINIC | Age: 28
End: 2023-07-19

## 2023-07-19 VITALS
WEIGHT: 245 LBS | SYSTOLIC BLOOD PRESSURE: 106 MMHG | DIASTOLIC BLOOD PRESSURE: 74 MMHG | HEIGHT: 67 IN | BODY MASS INDEX: 38.45 KG/M2

## 2023-07-19 DIAGNOSIS — Z34.83 ENCOUNTER FOR SUPERVISION OF NORMAL PREGNANCY IN MULTIGRAVIDA IN THIRD TRIMESTER: Primary | ICD-10-CM

## 2023-07-19 PROCEDURE — PNV: Performed by: PHYSICIAN ASSISTANT

## 2023-07-19 NOTE — PROGRESS NOTES
VISIT: Denies n/v/HA/cramping/vb/lof/edema/dv/smoking; urine neg/neg; GBS neg  Labs up to date  PNVs + DHA/iron - has not taken in a while  Good FM - r/gerson 10 kicks/2 hrs   Declines cervix check today    Reviewed signs and symptoms of labor and when to call; Reviewed signs and symptoms of pregnancy induced hypertension or preeclampsia and when to call  eIOL scheduled for 7 AM 7/21 - she is ready and hoping to go before then   RTO for postpartum checks or sooner if needed

## 2023-07-21 ENCOUNTER — HOSPITAL ENCOUNTER (OUTPATIENT)
Dept: LABOR AND DELIVERY | Facility: HOSPITAL | Age: 28
Discharge: HOME | End: 2023-07-21
Payer: COMMERCIAL

## 2023-07-21 ENCOUNTER — ANESTHESIA (INPATIENT)
Dept: ANESTHESIOLOGY | Facility: HOSPITAL | Age: 28
End: 2023-07-21
Payer: COMMERCIAL

## 2023-07-21 ENCOUNTER — HOSPITAL ENCOUNTER (INPATIENT)
Facility: HOSPITAL | Age: 28
LOS: 2 days | Discharge: HOME | End: 2023-07-23
Attending: OBSTETRICS & GYNECOLOGY | Admitting: OBSTETRICS & GYNECOLOGY
Payer: COMMERCIAL

## 2023-07-21 ENCOUNTER — ANESTHESIA EVENT (INPATIENT)
Dept: ANESTHESIOLOGY | Facility: HOSPITAL | Age: 28
End: 2023-07-21
Payer: COMMERCIAL

## 2023-07-21 DIAGNOSIS — O34.219 VBAC (VAGINAL BIRTH AFTER CESAREAN): ICD-10-CM

## 2023-07-21 DIAGNOSIS — Z3A.40 40 WEEKS GESTATION OF PREGNANCY: Primary | ICD-10-CM

## 2023-07-21 LAB
ABO GROUP BLD: NORMAL
BLD GP AB SCN SERPL QL: NEGATIVE
ERYTHROCYTE [DISTWIDTH] IN BLOOD BY AUTOMATED COUNT: 13.5 % (ref 11.6–15.1)
HCT VFR BLD AUTO: 34.4 % (ref 34.8–46.1)
HGB BLD-MCNC: 11.1 G/DL (ref 11.5–15.4)
HOLD SPECIMEN: NORMAL
MCH RBC QN AUTO: 29.1 PG (ref 26.8–34.3)
MCHC RBC AUTO-ENTMCNC: 32.3 G/DL (ref 31.4–37.4)
MCV RBC AUTO: 90 FL (ref 82–98)
PLATELET # BLD AUTO: 190 THOUSANDS/UL (ref 149–390)
PMV BLD AUTO: 11.7 FL (ref 8.9–12.7)
RBC # BLD AUTO: 3.81 MILLION/UL (ref 3.81–5.12)
RH BLD: POSITIVE
SPECIMEN EXPIRATION DATE: NORMAL
TREPONEMA PALLIDUM IGG+IGM AB [PRESENCE] IN SERUM OR PLASMA BY IMMUNOASSAY: NORMAL
WBC # BLD AUTO: 11.58 THOUSAND/UL (ref 4.31–10.16)

## 2023-07-21 PROCEDURE — 86900 BLOOD TYPING SEROLOGIC ABO: CPT

## 2023-07-21 PROCEDURE — 86780 TREPONEMA PALLIDUM: CPT

## 2023-07-21 PROCEDURE — 86901 BLOOD TYPING SEROLOGIC RH(D): CPT

## 2023-07-21 PROCEDURE — NC001 PR NO CHARGE: Performed by: OBSTETRICS & GYNECOLOGY

## 2023-07-21 PROCEDURE — 86850 RBC ANTIBODY SCREEN: CPT

## 2023-07-21 PROCEDURE — 85027 COMPLETE CBC AUTOMATED: CPT

## 2023-07-21 RX ORDER — CALCIUM CARBONATE 500 MG/1
500 TABLET, CHEWABLE ORAL DAILY PRN
Status: DISCONTINUED | OUTPATIENT
Start: 2023-07-21 | End: 2023-07-23 | Stop reason: HOSPADM

## 2023-07-21 RX ORDER — ROPIVACAINE HYDROCHLORIDE 2 MG/ML
INJECTION, SOLUTION EPIDURAL; INFILTRATION; PERINEURAL AS NEEDED
Status: DISCONTINUED | OUTPATIENT
Start: 2023-07-21 | End: 2023-07-24 | Stop reason: HOSPADM

## 2023-07-21 RX ORDER — ACETAMINOPHEN 325 MG/1
975 TABLET ORAL EVERY 6 HOURS PRN
Status: DISCONTINUED | OUTPATIENT
Start: 2023-07-21 | End: 2023-07-22

## 2023-07-21 RX ORDER — SODIUM CHLORIDE, SODIUM LACTATE, POTASSIUM CHLORIDE, CALCIUM CHLORIDE 600; 310; 30; 20 MG/100ML; MG/100ML; MG/100ML; MG/100ML
100 INJECTION, SOLUTION INTRAVENOUS CONTINUOUS
Status: DISCONTINUED | OUTPATIENT
Start: 2023-07-21 | End: 2023-07-23 | Stop reason: HOSPADM

## 2023-07-21 RX ORDER — ONDANSETRON 2 MG/ML
4 INJECTION INTRAMUSCULAR; INTRAVENOUS EVERY 6 HOURS PRN
Status: DISCONTINUED | OUTPATIENT
Start: 2023-07-21 | End: 2023-07-22

## 2023-07-21 RX ORDER — SODIUM CHLORIDE, SODIUM LACTATE, POTASSIUM CHLORIDE, CALCIUM CHLORIDE 600; 310; 30; 20 MG/100ML; MG/100ML; MG/100ML; MG/100ML
125 INJECTION, SOLUTION INTRAVENOUS CONTINUOUS
Status: DISCONTINUED | OUTPATIENT
Start: 2023-07-21 | End: 2023-07-23 | Stop reason: HOSPADM

## 2023-07-21 RX ORDER — OXYTOCIN/RINGER'S LACTATE 30/500 ML
1-30 PLASTIC BAG, INJECTION (ML) INTRAVENOUS
Status: DISCONTINUED | OUTPATIENT
Start: 2023-07-21 | End: 2023-07-23 | Stop reason: HOSPADM

## 2023-07-21 RX ORDER — LIDOCAINE HYDROCHLORIDE AND EPINEPHRINE 15; 5 MG/ML; UG/ML
INJECTION, SOLUTION EPIDURAL AS NEEDED
Status: DISCONTINUED | OUTPATIENT
Start: 2023-07-21 | End: 2023-07-24 | Stop reason: HOSPADM

## 2023-07-21 RX ADMIN — LIDOCAINE HYDROCHLORIDE AND EPINEPHRINE 3 ML: 15; 5 INJECTION, SOLUTION EPIDURAL at 20:26

## 2023-07-21 RX ADMIN — SODIUM CHLORIDE, SODIUM LACTATE, POTASSIUM CHLORIDE, AND CALCIUM CHLORIDE 125 ML/HR: .6; .31; .03; .02 INJECTION, SOLUTION INTRAVENOUS at 22:30

## 2023-07-21 RX ADMIN — Medication 2 MILLI-UNITS/MIN: at 08:45

## 2023-07-21 RX ADMIN — ROPIVACAINE HYDROCHLORIDE 5 MG: 2 INJECTION, SOLUTION EPIDURAL; INFILTRATION at 20:32

## 2023-07-21 RX ADMIN — SODIUM CHLORIDE, SODIUM LACTATE, POTASSIUM CHLORIDE, AND CALCIUM CHLORIDE 125 ML/HR: .6; .31; .03; .02 INJECTION, SOLUTION INTRAVENOUS at 17:00

## 2023-07-21 RX ADMIN — SODIUM CHLORIDE, SODIUM LACTATE, POTASSIUM CHLORIDE, AND CALCIUM CHLORIDE 500 ML: .6; .31; .03; .02 INJECTION, SOLUTION INTRAVENOUS at 23:56

## 2023-07-21 RX ADMIN — SODIUM CHLORIDE, SODIUM LACTATE, POTASSIUM CHLORIDE, AND CALCIUM CHLORIDE 125 ML/HR: .6; .31; .03; .02 INJECTION, SOLUTION INTRAVENOUS at 08:45

## 2023-07-21 RX ADMIN — SODIUM CHLORIDE, SODIUM LACTATE, POTASSIUM CHLORIDE, AND CALCIUM CHLORIDE 125 ML/HR: .6; .31; .03; .02 INJECTION, SOLUTION INTRAVENOUS at 21:20

## 2023-07-21 RX ADMIN — ROPIVACAINE HYDROCHLORIDE: 2 INJECTION, SOLUTION EPIDURAL; INFILTRATION at 20:30

## 2023-07-21 RX ADMIN — LIDOCAINE HYDROCHLORIDE AND EPINEPHRINE 2 ML: 15; 5 INJECTION, SOLUTION EPIDURAL at 20:30

## 2023-07-21 NOTE — PLAN OF CARE
Problem: BIRTH - VAGINAL/ SECTION  Goal: Fetal and maternal status remain reassuring during the birth process  Description: INTERVENTIONS:  - Monitor vital signs  - Monitor fetal heart rate  - Monitor uterine activity  - Monitor labor progression (vaginal delivery)  - DVT prophylaxis  - Antibiotic prophylaxis  Outcome: Progressing  Goal: Emotionally satisfying birthing experience for mother/fetus  Description: Interventions:  - Assess, plan, implement and evaluate the nursing care given to the patient in labor  - Advocate the philosophy that each childbirth experience is a unique experience and support the family's chosen level of involvement and control during the labor process   - Actively participate in both the patient's and family's teaching of the birth process  - Consider cultural, Congregation and age-specific factors and plan care for the patient in labor  Outcome: Progressing     Problem: PAIN - ADULT  Goal: Verbalizes/displays adequate comfort level or baseline comfort level  Description: Interventions:  - Encourage patient to monitor pain and request assistance  - Assess pain using appropriate pain scale  - Administer analgesics based on type and severity of pain and evaluate response  - Implement non-pharmacological measures as appropriate and evaluate response  - Consider cultural and social influences on pain and pain management  - Notify physician/advanced practitioner if interventions unsuccessful or patient reports new pain  Outcome: Progressing     Problem: INFECTION - ADULT  Goal: Absence or prevention of progression during hospitalization  Description: INTERVENTIONS:  - Assess and monitor for signs and symptoms of infection  - Monitor lab/diagnostic results  - Monitor all insertion sites, i.e. indwelling lines, tubes, and drains  - Monitor endotracheal if appropriate and nasal secretions for changes in amount and color  - [place] appropriate cooling/warming therapies per order  - Administer medications as ordered  - Instruct and encourage patient and family to use good hand hygiene technique  - Identify and instruct in appropriate isolation precautions for identified infection/condition  Outcome: Progressing     Problem: SAFETY ADULT  Goal: Patient will remain free of falls  Description: INTERVENTIONS:  - Educate patient/family on patient safety including physical limitations  - Instruct patient to call for assistance with activity   - Consult OT/PT to assist with strengthening/mobility   - Keep Call bell within reach  - Keep bed low and locked with side rails adjusted as appropriate  - Keep care items and personal belongings within reach  - Initiate and maintain comfort rounds  Outcome: Progressing  Goal: Maintain or return to baseline ADL function  Description: INTERVENTIONS:  -  Assess patient's ability to carry out ADLs; assess patient's baseline for ADL function and identify physical deficits which impact ability to perform ADLs (bathing, care of mouth/teeth, toileting, grooming, dressing, etc.)  - Assess/evaluate cause of self-care deficits   - Assess range of motion  - Assess patient's mobility; develop plan if impaired  - Assess patient's need for assistive devices and provide as appropriate  - Encourage maximum independence but intervene and supervise when necessary  - Involve family in performance of ADLs  - Assess for home care needs following discharge   - Consider OT consult to assist with ADL evaluation and planning for discharge  - Provide patient education as appropriate  Outcome: Progressing  Goal: Maintains/Returns to pre admission functional level  Description: INTERVENTIONS:  - Perform BMAT or MOVE assessment daily.   - Set and communicate daily mobility goal to care team and patient/family/caregiver.    - Collaborate with rehabilitation services on mobility goals if consulted  Outcome: Progressing     Problem: Knowledge Deficit  Goal: Patient/family/caregiver demonstrates understanding of disease process, treatment plan, medications, and discharge instructions  Description: Complete learning assessment and assess knowledge base.   Interventions:  - Provide teaching at level of understanding  - Provide teaching via preferred learning methods  Outcome: Progressing     Problem: DISCHARGE PLANNING  Goal: Discharge to home or other facility with appropriate resources  Description: INTERVENTIONS:  - Identify barriers to discharge w/patient and caregiver  - Arrange for needed discharge resources and transportation as appropriate  - Identify discharge learning needs (meds, wound care, etc.)  - Arrange for interpretive services to assist at discharge as needed  - Refer to Case Management Department for coordinating discharge planning if the patient needs post-hospital services based on physician/advanced practitioner order or complex needs related to functional status, cognitive ability, or social support system  Outcome: Progressing

## 2023-07-21 NOTE — OB LABOR/OXYTOCIN SAFETY PROGRESS
Oxytocin Safety Progress Check Note - Shelly Mohamud 33 y.o. female MRN: [id number]    Unit/Bed#: -01 Encounter: [id number]    Dose (georgette-units/min) Oxytocin: 18 georgette-units/min  Contraction Frequency (minutes): 2-4  Contraction Quality: Mild  Tachysystole: No   Cervical Dilation: 3-4        Cervical Effacement: 70  Fetal Station: -3  Baseline Rate: 140 bpm  Fetal Heart Rate: 145 BPM  FHR Category: Category I               Vital Signs:   Vitals:    07/21/23 0853   BP: 115/62   Pulse: 71   Resp: 18   Temp: 98 °F (36.7 °C)       Notes/comments:   6 hours since last SVE. Patient currently still comfortable with contractions. Discussed with patient recommendation for SVE at this time to monitor progress and assess for possibility of ROM. Patient reports she still is comfortable and that her body typically takes a significant amount of time to make change. Declines SVE at this time. FHT category I.  Discussed w. Dr. Faith Salazar MD 7/21/2023 3:56 PM

## 2023-07-21 NOTE — OB LABOR/OXYTOCIN SAFETY PROGRESS
Oxytocin Safety Progress Check Note - Fran Solis 33 y.o. female MRN: [id number]    Unit/Bed#: -01 Encounter: [id number]    Dose (georgette-units/min) Oxytocin: 20 georgette-units/min  Contraction Frequency (minutes): 2-4  Contraction Quality: Mild  Tachysystole: No   Cervical Dilation: 4        Cervical Effacement: 70  Fetal Station: -3  Baseline Rate: 135 bpm  Fetal Heart Rate: 130 BPM  FHR Category: Category I               Vital Signs:   Vitals:    07/21/23 1600   BP: 115/65   Pulse: 69   Resp:    Temp:        Notes/comments:   dIa has been tolerating her contractions well. Cervical exam is minimally changed. Membranes stripped at this time. Discussed AROM and epidural. Will continue pitocin titration. If patient reaches 30 of pitocin without cervical change, will allow pit break and snack and restart prior to AROM.        Yue Galvin MD 7/21/2023 4:55 PM

## 2023-07-21 NOTE — H&P
[facility] 33 y.o. female MRN: [id number]  Unit/Bed#: -01 Encounter: [id number]    Assessment: 33 y.o.  at 40w6d admitted for elective induction of labor. SVE: 3.5/70/-3  FHT:    Baseline: 125   Moderate variability   Accelerations: 15x15   Decelerations: None   Category I   Clinical EFW: 6lbs; Cephalic confirmed by US  GBS status: Negative      Plan:   * 40 weeks gestation of pregnancy  Assessment & Plan  Cephalic by ultrasound. Clinical EFW by Leopold's: 7lbs  GBS negative status  Plan for pitocin induction  Analgesia and/or epidural at patient request  Follow up CBC, Syphilis screening, blood type & antibody screen  Prenatal labs reviewed, gonorrhea/chlamydia unknown. Attempted to collect on admission, and counseled patient on importance of screening for baby. Patient declined, however accepts erythromycin after birth. Nursery aware. Discussed case and plan w/ Dr. Zakiya Pereira      Chief Complaint: "here for my induction"    HPI: Alisson Helm is a 33 y.o. [id number] with an HERNÁN of 7/15/2023, by Ultrasound at 40w6d who is being admitted for elective induction of labor. She reports irregular contractions, has no LOF, and reports no VB. She states she has felt good FM. Denies chest pain, SOB, headache, dizziness, vision changes, or lower extremity pain/swelling. Patient Active Problem List   Diagnosis   • Obesity in pregnancy   • History of  delivery, currently pregnant   • Hx of maternal laceration, 3rd degree, currently pregnant, third trimester   • History of blood transfusion   • Hx successful  (vaginal birth after ), currently pregnant   • 40 weeks gestation of pregnancy       Baby complications/comments: none    Review of Systems   Constitutional: Negative for chills and fever. Respiratory: Negative for shortness of breath. Cardiovascular: Negative for chest pain and leg swelling. Gastrointestinal: Negative for abdominal pain. Genitourinary: Negative for hematuria, vaginal discharge and vaginal pain. Neurological: Negative for dizziness and headaches. OB Hx:  OB History    Para Term  AB Living   3 2 2     2   SAB IAB Ectopic Multiple Live Births           2      # Outcome Date GA Lbr Matteo/2nd Weight Sex Delivery Anes PTL Lv   3 Current            2 Term  40w5d  4394 g (9 lb 11 oz) M  EPI  TIRSO   1 Term  41w3d  4252 g (9 lb 6 oz) F CS-LTranv   TIRSO      Complications: Failure to Progress in Second Stage      Obstetric Comments   : 2019 prim LTCS F;   M    Hgb electrophoresis WNL       Past Medical Hx:  History reviewed. No pertinent past medical history. Past Surgical hx:  Past Surgical History:   Procedure Laterality Date   •  SECTION         Social Hx:  Alcohol use: None  Tobacco use: None  Other substance use: None    No Known Allergies    No medications prior to admission. Objective: There is no height or weight on file to calculate BMI. Physical Exam:  Physical Exam  Genitourinary:      Vulva normal.   HENT:      Head: Normocephalic. Eyes:      General: No scleral icterus. Cardiovascular:      Rate and Rhythm: Normal rate and regular rhythm. Heart sounds: Normal heart sounds. No murmur heard. No friction rub. No gallop. Pulmonary:      Effort: Pulmonary effort is normal. No respiratory distress. Breath sounds: Normal breath sounds. Abdominal:      Palpations: Abdomen is soft. Tenderness: There is no abdominal tenderness. There is no guarding. Comments: Gravid   Musculoskeletal:      Right lower leg: No edema. Left lower leg: No edema. Neurological:      General: No focal deficit present. Mental Status: She is alert. Skin:     General: Skin is warm and dry. Vitals reviewed.             FHT:  Baseline Rate: 125 bpm  FHR Category: Category I    TOCO:   Contraction Frequency (minutes): irregular  Contraction Quality: Mild    Lab Results   Component Value Date    WBC 11.58 (H) 07/21/2023    HGB 11.1 (L) 07/21/2023    HCT 34.4 (L) 07/21/2023     07/21/2023     No results found for: "NA", "K", "CL", "CO2", "BUN", "CREATININE", "GLUCOSE", "AST", "ALT"  Prenatal Labs: Reviewed      Blood type: A+  Antibody: negative  GBS: negative  HIV: negative  Rubella: immune  RPR: non-reactive  HBsAg: non-reactive  HCAb: non-reactive  Chlamydia: unknown  Gonorrhea: unknown  Diabetes 1 hour screen: 99 mg/dL  Platelets: 841  Hgb: 12.3  >2 Midnights  INPATIENT     Signature/Title: Aftab Quiroz MD  Date: 7/21/2023  Time: 8:44 AM

## 2023-07-21 NOTE — OB LABOR/OXYTOCIN SAFETY PROGRESS
Oxytocin Safety Progress Check Note - Shelly Mohamud 33 y.o. female MRN: [id number]    Unit/Bed#: -01 Encounter: [id number]    Dose (georgette-units/min) Oxytocin: 10 georgette-units/min  Contraction Frequency (minutes): irregular  Contraction Quality: Mild  Tachysystole: No   Cervical Dilation: 3-4        Cervical Effacement: 70  Fetal Station: -3  Baseline Rate: 130 bpm  Fetal Heart Rate: 130 BPM  FHR Category: Category I               Vital Signs:   Vitals:    07/21/23 0853   BP: 115/62   Pulse: 71   Resp: 18   Temp: 98 °F (36.7 °C)       Notes/comments: Patient assessed for decelerations on the monitor. Patient is on birthing ball. She declines check. She will be placed on monitor instead of novi if we continue to have unreliable tracing. Will reassess in 2 hours or sooner if clinically indicated.          Ruth Aguilar MD 7/21/2023 11:25 AM

## 2023-07-21 NOTE — ASSESSMENT & PLAN NOTE
Cephalic by ultrasound. Clinical EFW by [facility]: 7lbs  GBS negative status  Plan for pitocin induction  Analgesia and/or epidural at patient request  Follow up CBC, Syphilis screening, blood type & antibody screen  Prenatal labs reviewed, gonorrhea/chlamydia unknown. Attempted to collect on admission, and counseled patient on importance of screening for baby. Patient declined, however accepts erythromycin after birth. Nursery aware.

## 2023-07-21 NOTE — OB LABOR/OXYTOCIN SAFETY PROGRESS
Oxytocin Safety Progress Check Note - Malissa Barrera 33 y.o. female MRN: [id number]    Unit/Bed#: -01 Encounter: [id number]    Dose (georgette-units/min) Oxytocin: 8 georgette-units/min  Contraction Frequency (minutes): irregular  Contraction Quality: Mild  Tachysystole: No   Cervical Dilation: 3-4        Cervical Effacement: 70  Fetal Station: -3  Baseline Rate: 130 bpm  Fetal Heart Rate: 130 BPM  FHR Category: Category I               Vital Signs:   Vitals:    07/21/23 0853   BP: 115/62   Pulse: 71   Resp: 18   Temp: 98 °F (36.7 °C)       Notes/comments: Patient not requiring exam at this time. FHT category I. Will reassess in 2 hours or sooner if clinically necessary.          Fernando Butler MD 7/21/2023 11:09 AM

## 2023-07-22 PROBLEM — O34.219 VBAC (VAGINAL BIRTH AFTER CESAREAN): Status: ACTIVE | Noted: 2023-07-22

## 2023-07-22 LAB
BASE EXCESS BLDCOV CALC-SCNC: -2.8 MMOL/L (ref 1–9)
HCO3 BLDCOV-SCNC: 21.3 MMOL/L (ref 12.2–28.6)
OXYHGB MFR BLDCOV: 58.4 %
PCO2 BLDCOV: 35.6 MM HG (ref 27–43)
PH BLDCOV: 7.39 [PH] (ref 7.19–7.49)
PO2 BLDCOV: 24.9 MM HG (ref 15–45)
SAO2 % BLDCOV: 14.3 ML/DL

## 2023-07-22 PROCEDURE — 4A1H7CZ MONITORING OF PRODUCTS OF CONCEPTION, CARDIAC RATE, VIA NATURAL OR ARTIFICIAL OPENING: ICD-10-PCS | Performed by: OBSTETRICS & GYNECOLOGY

## 2023-07-22 PROCEDURE — 10907ZC DRAINAGE OF AMNIOTIC FLUID, THERAPEUTIC FROM PRODUCTS OF CONCEPTION, VIA NATURAL OR ARTIFICIAL OPENING: ICD-10-PCS | Performed by: OBSTETRICS & GYNECOLOGY

## 2023-07-22 PROCEDURE — 99024 POSTOP FOLLOW-UP VISIT: CPT | Performed by: STUDENT IN AN ORGANIZED HEALTH CARE EDUCATION/TRAINING PROGRAM

## 2023-07-22 PROCEDURE — 3E033VJ INTRODUCTION OF OTHER HORMONE INTO PERIPHERAL VEIN, PERCUTANEOUS APPROACH: ICD-10-PCS | Performed by: OBSTETRICS & GYNECOLOGY

## 2023-07-22 PROCEDURE — 0KQM0ZZ REPAIR PERINEUM MUSCLE, OPEN APPROACH: ICD-10-PCS | Performed by: OBSTETRICS & GYNECOLOGY

## 2023-07-22 PROCEDURE — 10H07YZ INSERTION OF OTHER DEVICE INTO PRODUCTS OF CONCEPTION, VIA NATURAL OR ARTIFICIAL OPENING: ICD-10-PCS | Performed by: OBSTETRICS & GYNECOLOGY

## 2023-07-22 PROCEDURE — 4A1HXCZ MONITORING OF PRODUCTS OF CONCEPTION, CARDIAC RATE, EXTERNAL APPROACH: ICD-10-PCS | Performed by: OBSTETRICS & GYNECOLOGY

## 2023-07-22 PROCEDURE — 82805 BLOOD GASES W/O2 SATURATION: CPT | Performed by: OBSTETRICS & GYNECOLOGY

## 2023-07-22 PROCEDURE — 3E0E77Z INTRODUCTION OF ELECTROLYTIC AND WATER BALANCE SUBSTANCE INTO PRODUCTS OF CONCEPTION, VIA NATURAL OR ARTIFICIAL OPENING: ICD-10-PCS | Performed by: OBSTETRICS & GYNECOLOGY

## 2023-07-22 RX ORDER — OXYTOCIN/RINGER'S LACTATE 30/500 ML
250 PLASTIC BAG, INJECTION (ML) INTRAVENOUS CONTINUOUS
Status: ACTIVE | OUTPATIENT
Start: 2023-07-22 | End: 2023-07-22

## 2023-07-22 RX ORDER — DIPHENHYDRAMINE HCL 25 MG
25 TABLET ORAL EVERY 6 HOURS PRN
Status: DISCONTINUED | OUTPATIENT
Start: 2023-07-22 | End: 2023-07-23 | Stop reason: HOSPADM

## 2023-07-22 RX ORDER — MAGNESIUM HYDROXIDE/ALUMINUM HYDROXICE/SIMETHICONE 120; 1200; 1200 MG/30ML; MG/30ML; MG/30ML
15 SUSPENSION ORAL EVERY 6 HOURS PRN
Status: DISCONTINUED | OUTPATIENT
Start: 2023-07-22 | End: 2023-07-23 | Stop reason: HOSPADM

## 2023-07-22 RX ORDER — DOCUSATE SODIUM 100 MG/1
100 CAPSULE, LIQUID FILLED ORAL 2 TIMES DAILY
Status: DISCONTINUED | OUTPATIENT
Start: 2023-07-22 | End: 2023-07-23 | Stop reason: HOSPADM

## 2023-07-22 RX ORDER — ACETAMINOPHEN 325 MG/1
650 TABLET ORAL EVERY 6 HOURS SCHEDULED
Status: DISCONTINUED | OUTPATIENT
Start: 2023-07-22 | End: 2023-07-23 | Stop reason: HOSPADM

## 2023-07-22 RX ORDER — CALCIUM CARBONATE 500 MG/1
1000 TABLET, CHEWABLE ORAL 3 TIMES DAILY PRN
Status: DISCONTINUED | OUTPATIENT
Start: 2023-07-22 | End: 2023-07-23 | Stop reason: HOSPADM

## 2023-07-22 RX ORDER — IBUPROFEN 600 MG/1
600 TABLET ORAL EVERY 6 HOURS SCHEDULED
Status: DISCONTINUED | OUTPATIENT
Start: 2023-07-22 | End: 2023-07-23 | Stop reason: HOSPADM

## 2023-07-22 RX ORDER — DIAPER,BRIEF,INFANT-TODD,DISP
1 EACH MISCELLANEOUS DAILY PRN
Status: DISCONTINUED | OUTPATIENT
Start: 2023-07-22 | End: 2023-07-23 | Stop reason: HOSPADM

## 2023-07-22 RX ORDER — SIMETHICONE 80 MG
80 TABLET,CHEWABLE ORAL 4 TIMES DAILY PRN
Status: DISCONTINUED | OUTPATIENT
Start: 2023-07-22 | End: 2023-07-23 | Stop reason: HOSPADM

## 2023-07-22 RX ORDER — SENNOSIDES 8.6 MG
1 TABLET ORAL DAILY
Status: DISCONTINUED | OUTPATIENT
Start: 2023-07-22 | End: 2023-07-23 | Stop reason: HOSPADM

## 2023-07-22 RX ORDER — ONDANSETRON 2 MG/ML
4 INJECTION INTRAMUSCULAR; INTRAVENOUS EVERY 8 HOURS PRN
Status: DISCONTINUED | OUTPATIENT
Start: 2023-07-22 | End: 2023-07-23 | Stop reason: HOSPADM

## 2023-07-22 RX ORDER — OXYTOCIN/RINGER'S LACTATE 30/500 ML
250 PLASTIC BAG, INJECTION (ML) INTRAVENOUS
Status: ACTIVE | OUTPATIENT
Start: 2023-07-22 | End: 2023-07-22

## 2023-07-22 RX ADMIN — OXYTOCIN 250 MILLI-UNITS/MIN: 10 INJECTION INTRAVENOUS at 04:11

## 2023-07-22 RX ADMIN — SODIUM CHLORIDE, SODIUM LACTATE, POTASSIUM CHLORIDE, AND CALCIUM CHLORIDE 100 ML/HR: .6; .31; .03; .02 INJECTION, SOLUTION INTRAVENOUS at 01:45

## 2023-07-22 RX ADMIN — SENNOSIDES 8.6 MG: 8.6 TABLET, FILM COATED ORAL at 08:46

## 2023-07-22 RX ADMIN — WITCH HAZEL 1 PAD: 500 SOLUTION RECTAL; TOPICAL at 05:15

## 2023-07-22 RX ADMIN — BENZOCAINE AND LEVOMENTHOL 1 APPLICATION: 200; 5 SPRAY TOPICAL at 05:15

## 2023-07-22 RX ADMIN — DOCUSATE SODIUM 100 MG: 100 CAPSULE, LIQUID FILLED ORAL at 18:20

## 2023-07-22 RX ADMIN — IBUPROFEN 600 MG: 600 TABLET, FILM COATED ORAL at 13:04

## 2023-07-22 RX ADMIN — IBUPROFEN 600 MG: 600 TABLET, FILM COATED ORAL at 06:44

## 2023-07-22 RX ADMIN — ACETAMINOPHEN 975 MG: 325 TABLET, FILM COATED ORAL at 05:15

## 2023-07-22 RX ADMIN — ACETAMINOPHEN 650 MG: 325 TABLET, FILM COATED ORAL at 16:33

## 2023-07-22 RX ADMIN — DOCUSATE SODIUM 100 MG: 100 CAPSULE, LIQUID FILLED ORAL at 08:46

## 2023-07-22 RX ADMIN — OXYTOCIN 250 MILLI-UNITS/MIN: 10 INJECTION INTRAVENOUS at 03:03

## 2023-07-22 RX ADMIN — IBUPROFEN 600 MG: 600 TABLET, FILM COATED ORAL at 18:20

## 2023-07-22 RX ADMIN — ACETAMINOPHEN 650 MG: 325 TABLET, FILM COATED ORAL at 22:32

## 2023-07-22 RX ADMIN — HYDROCORTISONE 1 APPLICATION: 1 CREAM TOPICAL at 05:15

## 2023-07-22 NOTE — ANESTHESIA PREPROCEDURE EVALUATION
Procedure:  LABOR ANALGESIA    Relevant Problems   GYN   (+) 40 weeks gestation of pregnancy        Physical Exam    Airway    Mallampati score: III  TM Distance: >3 FB  Neck ROM: full     Dental   No notable dental hx     Cardiovascular  Cardiovascular exam normal    Pulmonary  Pulmonary exam normal     Other Findings        Anesthesia Plan  ASA Score- 2     Anesthesia Type- epidural with ASA Monitors. Additional Monitors:   Airway Plan:           Plan Factors-Exercise tolerance (METS): >4 METS. Chart reviewed. Existing labs reviewed. Patient summary reviewed. Patient is not a current smoker. Induction-     Postoperative Plan-     Informed Consent- Anesthetic plan and risks discussed with patient. I personally reviewed this patient with the CRNA. Discussed and agreed on the Anesthesia Plan with the CRNA. Anson Amador

## 2023-07-22 NOTE — ASSESSMENT & PLAN NOTE
Continue routine post partum care  Pain:  Tylenol/Motrin as needed  Lochia: Continue to observe  OOB: continue to encourage ambulation  Flatus :  We will monitor  Breastfeeding: []  Contraception: []  Baby in: room  Dispo: anticipate  postpartum day 1 versus 2

## 2023-07-22 NOTE — OB LABOR/OXYTOCIN SAFETY PROGRESS
Oxytocin Safety Progress Check Note - Jona Areas 33 y.o. female MRN: [id number]    Unit/Bed#: -01 Encounter: [id number]    Dose (georgette-units/min) Oxytocin: 24 georgette-units/min  Contraction Frequency (minutes): 1-2.5  Contraction Quality: Moderate, Mild  Tachysystole: No     Cervical Dilation: 5-6  Cervical Effacement: 70  Fetal Station: -3     Baseline Rate: 140 bpm  Fetal Heart Rate: 140 BPM  FHR Category: Category I      Pt tolerating contractions well; feeling more intensity. Change as above. Plan for epidural and AROM when comfortable.        Vital Signs:   Vitals:    07/21/23 1741   BP: 104/63   Pulse: 69   Resp:    Temp:      D/w Dr. Judi Fuller MD 7/21/2023 8:07 PM

## 2023-07-22 NOTE — PLAN OF CARE
Problem: BIRTH - VAGINAL/ SECTION  Goal: Fetal and maternal status remain reassuring during the birth process  Description: INTERVENTIONS:  - Monitor vital signs  - Monitor fetal heart rate  - Monitor uterine activity  - Monitor labor progression (vaginal delivery)  - DVT prophylaxis  - Antibiotic prophylaxis  Outcome: Progressing  Goal: Emotionally satisfying birthing experience for mother/fetus  Description: Interventions:  - Assess, plan, implement and evaluate the nursing care given to the patient in labor  - Advocate the philosophy that each childbirth experience is a unique experience and support the family's chosen level of involvement and control during the labor process   - Actively participate in both the patient's and family's teaching of the birth process  - Consider cultural, [employer]ism and age-specific factors and plan care for the patient in labor  Outcome: Progressing     Problem: PAIN - ADULT  Goal: Verbalizes/displays adequate comfort level or baseline comfort level  Description: Interventions:  - Encourage patient to monitor pain and request assistance  - Assess pain using appropriate pain scale  - Administer analgesics based on type and severity of pain and evaluate response  - Implement non-pharmacological measures as appropriate and evaluate response  - Consider cultural and social influences on pain and pain management  - Notify physician/advanced practitioner if interventions unsuccessful or patient reports new pain  Outcome: Progressing     Problem: INFECTION - ADULT  Goal: Absence or prevention of progression during hospitalization  Description: INTERVENTIONS:  - Assess and monitor for signs and symptoms of infection  - Monitor lab/diagnostic results  - Monitor all insertion sites, i.e. indwelling lines, tubes, and drains  - Monitor endotracheal if appropriate and nasal secretions for changes in amount and color  - [place] appropriate cooling/warming therapies per order  - Administer medications as ordered  - Instruct and encourage patient and family to use good hand hygiene technique  - Identify and instruct in appropriate isolation precautions for identified infection/condition  Outcome: Progressing     Problem: SAFETY ADULT  Goal: Patient will remain free of falls  Description: INTERVENTIONS:  - Educate patient/family on patient safety including physical limitations  - Instruct patient to call for assistance with activity   - Consult OT/PT to assist with strengthening/mobility   - Keep Call bell within reach  - Keep bed low and locked with side rails adjusted as appropriate  - Keep care items and personal belongings within reach  - Initiate and maintain comfort rounds  Outcome: Progressing  Goal: Maintain or return to baseline ADL function  Description: INTERVENTIONS:  -  Assess patient's ability to carry out ADLs; assess patient's baseline for ADL function and identify physical deficits which impact ability to perform ADLs (bathing, care of mouth/teeth, toileting, grooming, dressing, etc.)  - Assess/evaluate cause of self-care deficits   - Assess range of motion  - Assess patient's mobility; develop plan if impaired  - Assess patient's need for assistive devices and provide as appropriate  - Encourage maximum independence but intervene and supervise when necessary  - Involve family in performance of ADLs  - Assess for home care needs following discharge   - Consider OT consult to assist with ADL evaluation and planning for discharge  - Provide patient education as appropriate  Outcome: Progressing  Goal: Maintains/Returns to pre admission functional level  Description: INTERVENTIONS:  - Perform BMAT or MOVE assessment daily.   - Set and communicate daily mobility goal to care team and patient/family/caregiver.    - Collaborate with rehabilitation services on mobility goals if consulted  Outcome: Progressing     Problem: Knowledge Deficit  Goal: Patient/family/caregiver demonstrates understanding of disease process, treatment plan, medications, and discharge instructions  Description: Complete learning assessment and assess knowledge base.   Interventions:  - Provide teaching at level of understanding  - Provide teaching via preferred learning methods  Outcome: Progressing     Problem: DISCHARGE PLANNING  Goal: Discharge to home or other facility with appropriate resources  Description: INTERVENTIONS:  - Identify barriers to discharge w/patient and caregiver  - Arrange for needed discharge resources and transportation as appropriate  - Identify discharge learning needs (meds, wound care, etc.)  - Arrange for interpretive services to assist at discharge as needed  - Refer to Case Management Department for coordinating discharge planning if the patient needs post-hospital services based on physician/advanced practitioner order or complex needs related to functional status, cognitive ability, or social support system  Outcome: Progressing

## 2023-07-22 NOTE — OB LABOR/OXYTOCIN SAFETY PROGRESS
Oxytocin Safety Progress Check Note - Ritchie Valle 33 y.o. female MRN: [id number]    Unit/Bed#: -01 Encounter: [id number]    Dose (georgette-units/min) Oxytocin: 6 georgette-units/min (reduced by half due to decelerations, per Dr. Qamar Gallegos)  Contraction Frequency (minutes): 2.5-3  Contraction Quality: Moderate  Tachysystole: No   Cervical Dilation: 8        Cervical Effacement: 80  Fetal Station: -2  Baseline Rate: 135 bpm  Fetal Heart Rate: 132 BPM  FHR Category: Category II               Vital Signs:   Vitals:    07/21/23 2330   BP: 102/50   Pulse:    Resp:    Temp: 98.5 °F (36.9 °C)   SpO2:        Notes/comments:   FHT with intermittent lates in the setting of moderate variability. Discussed with Dr. Ninoska Sarah, and amnioinfusion ordered. Will monitor and consider SVE pending response.         Ted Muñoz MD 7/21/2023 11:49 PM

## 2023-07-22 NOTE — L&D DELIVERY NOTE
Vaginal Delivery Summary - OB/GYN   Qasim Varma 33 y.o. female MRN: [id number]  Unit/Bed#: -01 Encounter: [id number]          Pre-delivery Diagnosis:   1. Pregnancy at 41 and 0 days  2. History of  delivery  3. History of successful   4. Obesity in pregnancy    Post-delivery Diagnosis: same, delivered    Procedure: Vaginal birth after     Attending: Dr. Karlo Lord    Assistant(s): Dr. Lisa Gaines    Anesthesia: Epidural    QBL: Pending     Complications: none apparent    Specimens:   1. Arterial and venous cord gases  2. Cord blood  3. Segment of umbilical cord  4. Placenta to storage     Findings:  1. Viable male on 2023 at [address], with APGARS of 9 and 9 at 1 and 5 minutes respectively,  2. Spontaneous delivery of intact placenta at 0  3. Second degree laceration repaired with 3-0 Vicryl Rapide  4. Umbilical Cord Venous Blood Gas:  Results from last 7 days   Lab Units 23  0308   PH COV  7.394   PCO2 COV mm HG 35.6   HCO3 COV mmol/L 21.3   BASE EXC COV mmol/L -2.8*   O2 CT CD VB mL/dL 14.3   O2 HGB, VENOUS CORD % 58.4     5. Umbilical Cord Arterial Blood Gas:          Disposition:  Patient tolerated the procedure well and was recovering in labor and delivery room       Brief history and labor course:  Ms. Qasim Varma is a 33 y.o. G 3 P  at [address]. She presented to labor and delivery for elective induction of labor. On exam in triage she was noted to be 3.5/70/-3. Her FHT had a baseline of 125 with moderate variability, positive accelerations, no decelerations. She was admitted for elective induction of labor with Pitocin. Although the patient was noted to have some decelerations while using the birthing ball, she declined SVE. She continued to decline SVE even after her fetal heart tracing returned to category 1. She received an epidural for pain control.   Fetal heart tracing was notable for some late decelerations associated with hypotension following epidural placement. This resolved after patient repositioning and Pitocin was held. Artificial amniotomy was performed for clear fluid and an IUPC was placed. An amnioinfusion was performed and the patient progressed to complete. Patient was noted to have prolonged deceleration after she was complete. Pitocin was held, and once strip was category 1, patient began pushing. Description of procedure:    After pushing for 4 minutes, at [address] patient delivered a viable male , wt pending, apgars of 9 (1 min) and 9 (5 min). The fetal vertex delivered spontaneously. There was no nuchal cord. Baby was OA, restituted to LEILANI. The right anterior shoulder delivered atraumatically with maternal expulsive forces and the assistance of gentle downward traction. The left posterior shoulder delivered with maternal expulsive forces and the assistance of gentle upward traction. The remainder of the fetus delivered spontaneously. Upon delivery, the infant was placed on the mothers abdomen and the cord was clamped and cut. The infant was noted to cry spontaneously and was moving all extremities appropriately. There was no evidence for injury. Awaiting nurse resuscitators evaluated the . Arterial and venous cord blood gases and cord blood was collected for analysis. These were promptly sent to the lab. In the immediate post-partum, 30 units of IV pitocin was administered, and the uterus was noted to contract down well with massage and pitocin. The placenta delivered spontaneously at [address] and was noted to be circumvallate with a marginally inserted 3 cord vessel. The vagina, cervix, perineum, and rectum were inspected and there was noted to be a second-degree perineal laceration. Second-degree laceration Repair  The vagina, cervix, perineum, and rectum were inspected and there was noted to be a 2nd degree laceration which was repaired with 3-0 vicryl rapide.  Under adequate anesthesia, the apex of the vaginal laceration was identified and an anchoring suture was placed 1 cm above the apex. The vaginal mucosa and underlying rectovaginal fascia were closed using a running locked 3-0 Vicryl-CT 1 suture to the hymenal ring. The suture was then brought underneath the hymenal ring and placed through the bulbocavernosus muscle. Continuing with the same suture, the transverse perineal muscles were reapproximated with 2 transverse running sutures. The suture was brought to the posterior apex of the skin laceration and then the skin was reapproximated in a subcuticular fashion to the hymenal ring. Excellent hemostasis was achieved. At the conclusion of the procedure, all needle, sponge, and instrument counts were noted to be correct. Patient tolerated the procedure well and was allowed to recover in labor and delivery room with family and  before being transferred to the post-partum floor. Dr. Norwood Fabry was present and participated in all key portions of the case.       Florencia Arreola MD  OB/GYN PGY-1  2023  4:13 AM

## 2023-07-22 NOTE — ANESTHESIA PROCEDURE NOTES
Epidural Block    Patient location during procedure: OB  Start time: 7/21/2023 8:19 PM  Reason for block: at surgeon's request  Staffing  Performed by: Nella Waterman MD  Authorized by: Nella Waterman MD    Preanesthetic Checklist  Completed: patient identified, IV checked, site marked, risks and benefits discussed, surgical consent, monitors and equipment checked, pre-op evaluation and timeout performed  Epidural  Patient position: sitting  Prep: ChloraPrep  Patient monitoring: heart rate, continuous pulse ox and frequent blood pressure checks  Approach: midline  Location: lumbar  Injection technique: JACQUELIN saline  Needle  Needle type: Tuohy   Needle gauge: 17 G  Catheter type: side hole  Catheter size: 20 G  Catheter at skin depth: 13 cm  Catheter securement method: stabilization device  Test dose: negative  Assessment  Sensory level: T10  Number of attempts: 1negative aspiration for CSF, negative aspiration for heme and no paresthesia on injection  patient tolerated the procedure well with no immediate complications

## 2023-07-22 NOTE — OB LABOR/OXYTOCIN SAFETY PROGRESS
Oxytocin Safety Progress Check Note - Fran Dunn 33 y.o. female MRN: [id number]    Unit/Bed#: -01 Encounter: [id number]    Dose (georgette-units/min) Oxytocin: 24 georgette-units/min  Contraction Frequency (minutes): 1-2.5  Contraction Quality: Moderate, Mild  Tachysystole: No   Cervical Dilation: 5-6        Cervical Effacement: 70  Fetal Station: -3  Baseline Rate: 140 bpm  Fetal Heart Rate: 142 BPM  FHR Category: Category I               Vital Signs:   Vitals:    07/21/23 1741   BP: 104/63   Pulse: 69   Resp:    Temp:        Notes/comments:  Recurrent late decelerations noted in association with hypotension following epidural.  Patient repositioned, and Pitocin held with plan to re-start in 10 min pending improvement of FHR. SVE deferred. Dr. Irene Waggoner aware.         Susanna Avina MD 7/21/2023 8:53 PM

## 2023-07-22 NOTE — PLAN OF CARE
Problem: PAIN - ADULT  Goal: Verbalizes/displays adequate comfort level or baseline comfort level  Description: Interventions:  - Encourage patient to monitor pain and request assistance  - Assess pain using appropriate pain scale  - Administer analgesics based on type and severity of pain and evaluate response  - Implement non-pharmacological measures as appropriate and evaluate response  - Consider cultural and social influences on pain and pain management  - Notify physician/advanced practitioner if interventions unsuccessful or patient reports new pain  Outcome: Progressing     Problem: INFECTION - ADULT  Goal: Absence or prevention of progression during hospitalization  Description: INTERVENTIONS:  - Assess and monitor for signs and symptoms of infection  - Monitor lab/diagnostic results  - Monitor all insertion sites, i.e. indwelling lines, tubes, and drains  - Monitor endotracheal if appropriate and nasal secretions for changes in amount and color  - [place] appropriate cooling/warming therapies per order  - Administer medications as ordered  - Instruct and encourage patient and family to use good hand hygiene technique  - Identify and instruct in appropriate isolation precautions for identified infection/condition  Outcome: Progressing     Problem: SAFETY ADULT  Goal: Patient will remain free of falls  Description: INTERVENTIONS:  - Educate patient/family on patient safety including physical limitations  - Instruct patient to call for assistance with activity   - Consult OT/PT to assist with strengthening/mobility   - Keep Call bell within reach  - Keep bed low and locked with side rails adjusted as appropriate  - Keep care items and personal belongings within reach  - Initiate and maintain comfort rounds  Outcome: Progressing  Goal: Maintain or return to baseline ADL function  Description: INTERVENTIONS:  -  Assess patient's ability to carry out ADLs; assess patient's baseline for ADL function and identify physical deficits which impact ability to perform ADLs (bathing, care of mouth/teeth, toileting, grooming, dressing, etc.)  - Assess/evaluate cause of self-care deficits   - Assess range of motion  - Assess patient's mobility; develop plan if impaired  - Assess patient's need for assistive devices and provide as appropriate  - Encourage maximum independence but intervene and supervise when necessary  - Involve family in performance of ADLs  - Assess for home care needs following discharge   - Consider OT consult to assist with ADL evaluation and planning for discharge  - Provide patient education as appropriate  Outcome: Progressing  Goal: Maintains/Returns to pre admission functional level  Description: INTERVENTIONS:  - Perform BMAT or MOVE assessment daily.   - Set and communicate daily mobility goal to care team and patient/family/caregiver. - Collaborate with rehabilitation services on mobility goals if consulted  Outcome: Progressing     Problem: Knowledge Deficit  Goal: Patient/family/caregiver demonstrates understanding of disease process, treatment plan, medications, and discharge instructions  Description: Complete learning assessment and assess knowledge base.   Interventions:  - Provide teaching at level of understanding  - Provide teaching via preferred learning methods  Outcome: Progressing     Problem: DISCHARGE PLANNING  Goal: Discharge to home or other facility with appropriate resources  Description: INTERVENTIONS:  - Identify barriers to discharge w/patient and caregiver  - Arrange for needed discharge resources and transportation as appropriate  - Identify discharge learning needs (meds, wound care, etc.)  - Arrange for interpretive services to assist at discharge as needed  - Refer to Case Management Department for coordinating discharge planning if the patient needs post-hospital services based on physician/advanced practitioner order or complex needs related to functional status, cognitive ability, or social support system  Outcome: Progressing     Problem: POSTPARTUM  Goal: Experiences normal postpartum course  Description: INTERVENTIONS:  - Monitor maternal vital signs  - Assess uterine involution and lochia  Outcome: Progressing  Goal: Appropriate maternal -  bonding  Description: INTERVENTIONS:  - Identify family support  - Assess for appropriate maternal/infant bonding   -Encourage maternal/infant bonding opportunities  - Referral to  or  as needed  Outcome: Progressing  Goal: Establishment of infant feeding pattern  Description: INTERVENTIONS:  - Assess breast/bottle feeding  - Refer to lactation as needed  Outcome: Progressing  Goal: Incision(s), wounds(s) or drain site(s) healing without S/S of infection  Description: INTERVENTIONS  - Assess and document dressing, incision, wound bed, drain sites and surrounding tissue  - Provide patient and family education  Outcome: Progressing

## 2023-07-22 NOTE — PLAN OF CARE
Problem: PAIN - ADULT  Goal: Verbalizes/displays adequate comfort level or baseline comfort level  Description: Interventions:  - Encourage patient to monitor pain and request assistance  - Assess pain using appropriate pain scale  - Administer analgesics based on type and severity of pain and evaluate response  - Implement non-pharmacological measures as appropriate and evaluate response  - Consider cultural and social influences on pain and pain management  - Notify physician/advanced practitioner if interventions unsuccessful or patient reports new pain  7/22/2023 0326 by Alfredo Swann RN  Outcome: Progressing  7/21/2023 2133 by Alfredo Swann RN  Outcome: Progressing     Problem: INFECTION - ADULT  Goal: Absence or prevention of progression during hospitalization  Description: INTERVENTIONS:  - Assess and monitor for signs and symptoms of infection  - Monitor lab/diagnostic results  - Monitor all insertion sites, i.e. indwelling lines, tubes, and drains  - Monitor endotracheal if appropriate and nasal secretions for changes in amount and color  - [place] appropriate cooling/warming therapies per order  - Administer medications as ordered  - Instruct and encourage patient and family to use good hand hygiene technique  - Identify and instruct in appropriate isolation precautions for identified infection/condition  7/22/2023 0326 by Alfredo Swann RN  Outcome: Progressing  7/21/2023 2133 by Alfredo Swann RN  Outcome: Progressing     Problem: SAFETY ADULT  Goal: Patient will remain free of falls  Description: INTERVENTIONS:  - Educate patient/family on patient safety including physical limitations  - Instruct patient to call for assistance with activity   - Consult OT/PT to assist with strengthening/mobility   - Keep Call bell within reach  - Keep bed low and locked with side rails adjusted as appropriate  - Keep care items and personal belongings within reach  - Initiate and maintain comfort rounds  7/22/2023 0326 by Judith Jamison RN  Outcome: Progressing  7/21/2023 2133 by Judith Jamison RN  Outcome: Progressing  Goal: Maintain or return to baseline ADL function  Description: INTERVENTIONS:  -  Assess patient's ability to carry out ADLs; assess patient's baseline for ADL function and identify physical deficits which impact ability to perform ADLs (bathing, care of mouth/teeth, toileting, grooming, dressing, etc.)  - Assess/evaluate cause of self-care deficits   - Assess range of motion  - Assess patient's mobility; develop plan if impaired  - Assess patient's need for assistive devices and provide as appropriate  - Encourage maximum independence but intervene and supervise when necessary  - Involve family in performance of ADLs  - Assess for home care needs following discharge   - Consider OT consult to assist with ADL evaluation and planning for discharge  - Provide patient education as appropriate  7/22/2023 0326 by Judith Jamison RN  Outcome: Progressing  7/21/2023 2133 by Judith Jamison RN  Outcome: Progressing  Goal: Maintains/Returns to pre admission functional level  Description: INTERVENTIONS:  - Perform BMAT or MOVE assessment daily.   - Set and communicate daily mobility goal to care team and patient/family/caregiver. - Collaborate with rehabilitation services on mobility goals if consulted  7/22/2023 0326 by Judith Jamison RN  Outcome: Progressing  7/21/2023 2133 by Judith Jamison RN  Outcome: Progressing     Problem: Knowledge Deficit  Goal: Patient/family/caregiver demonstrates understanding of disease process, treatment plan, medications, and discharge instructions  Description: Complete learning assessment and assess knowledge base.   Interventions:  - Provide teaching at level of understanding  - Provide teaching via preferred learning methods  7/22/2023 0326 by Judith Jamison RN  Outcome: Progressing  7/21/2023 2133 by Casey Pascal RN  Outcome: Progressing     Problem: DISCHARGE PLANNING  Goal: Discharge to home or other facility with appropriate resources  Description: INTERVENTIONS:  - Identify barriers to discharge w/patient and caregiver  - Arrange for needed discharge resources and transportation as appropriate  - Identify discharge learning needs (meds, wound care, etc.)  - Arrange for interpretive services to assist at discharge as needed  - Refer to Case Management Department for coordinating discharge planning if the patient needs post-hospital services based on physician/advanced practitioner order or complex needs related to functional status, cognitive ability, or social support system  2023 0326 by Casey Pascal RN  Outcome: Progressing  2023 2133 by Casey Pascal RN  Outcome: Progressing     Problem: POSTPARTUM  Goal: Experiences normal postpartum course  Description: INTERVENTIONS:  - Monitor maternal vital signs  - Assess uterine involution and lochia  Outcome: Progressing  Goal: Appropriate maternal -  bonding  Description: INTERVENTIONS:  - Identify family support  - Assess for appropriate maternal/infant bonding   -Encourage maternal/infant bonding opportunities  - Referral to  or  as needed  Outcome: Progressing  Goal: Establishment of infant feeding pattern  Description: INTERVENTIONS:  - Assess breast/bottle feeding  - Refer to lactation as needed  Outcome: Progressing  Goal: Incision(s), wounds(s) or drain site(s) healing without S/S of infection  Description: INTERVENTIONS  - Assess and document dressing, incision, wound bed, drain sites and surrounding tissue  - Provide patient and family education  - Perform skin care/dressing changes every   Outcome: Progressing

## 2023-07-22 NOTE — OB LABOR/OXYTOCIN SAFETY PROGRESS
Oxytocin Safety Progress Check Note - Alisson Helm 33 y.o. female MRN: [id number]    Unit/Bed#: -01 Encounter: [id number]    Dose (georgette-units/min) Oxytocin: 12 georgette-units/min (restarted at 12U per Dr. Zakiya Pereira)  Contraction Frequency (minutes): 2-3  Contraction Quality: Moderate  Tachysystole: No   Cervical Dilation: 8        Cervical Effacement: 80  Fetal Station: -2  Baseline Rate: 140 bpm  Fetal Heart Rate: 145 BPM  FHR Category: Category II               Vital Signs:   Vitals:    07/21/23 2200   BP: 108/58   Pulse: 67   Resp:    Temp:    SpO2: 100%       Notes/comments:   FHT with intermittent bordering on recurrent late decelerations, category 2. Contractions every 2-4 min with pit running at 12. SVE 8/80/-2. Fetal head well applied, and AROM performed for clear fluid. IUPC placed for improved contraction monitoring. Will monitor FHT to determine response to AROM and consider decreasing pit if lates are ongoing. Dr. Zakiya Pereira aware.         Abad Hammonds MD 7/21/2023 10:29 PM

## 2023-07-22 NOTE — DISCHARGE SUMMARY
Discharge Summary - OB/GYN   Rah Montalvo 33 y.o. female MRN: [id number]  Unit/Bed#: -01 Encounter: [id number]      Admission Date: 2023     Discharge Date: 23     Admitting Diagnosis:   Patient Active Problem List   Diagnosis   • Obesity in pregnancy   • History of  delivery, currently pregnant   • Hx of maternal laceration, 3rd degree, currently pregnant, third trimester   • History of blood transfusion   • Hx successful  (vaginal birth after ), currently pregnant   • 40 weeks gestation of pregnancy   •  (vaginal birth after )        Discharge Diagnosis:   Same, delivered      Procedures: vaginal birth after  ()    Delivery Attending: Lia Estrada MD  Discharge Attending: Nadia Cardoso MD    Hospital Course:     Ms. Rah Montalvo is a 33 y.o. G 3 P  at [facility] 0 d. She presented to labor and delivery for elective induction of labor. On exam in triage she was noted to be 3.5/70/-3. Her FHT had a baseline of 125 with moderate variability, positive accelerations, no decelerations. She was admitted for elective induction of labor with Pitocin. Although the patient was noted to have some decelerations while using the birthing ball, she declined SVE. She continued to decline SVE even after her fetal heart tracing returned to category 1. She received an epidural for pain control. Fetal heart tracing was notable for some late decelerations associated with hypotension following epidural placement. This resolved after patient repositioning and Pitocin was held. Artificial amniotomy was performed for clear fluid and an IUPC was placed. An amnioinfusion was performed and the patient progressed to complete. Patient was noted to have prolonged deceleration after she was complete. Pitocin was held, and once strip was category 1, patient began pushing. She delivered a viable male  on 23 at [address].  Weight 9lbs 8.2oz via vaginal birth after  (). Apgars were 9 (1 min) and 9 (5 min).  was transferred to  nursery. Patient tolerated the procedure well and was transferred to recovery in stable condition. Her postpartum course was uncomplicated. Her postpartum pain was well controlled with oral analgesics. On day of discharge, she was ambulating and able to reasonably perform all ADLs. She was voiding and had appropriate bowel function. Pain was well controlled. She was discharged home on post-partum day #1 without complications. Patient was instructed to follow up with her OB as an outpatient and was given appropriate warnings to call provider if she develops signs of infection or uncontrolled pain. Complications: none apparent    Condition at discharge: good     Discharge instructions/Information to patient and family:   - Do not place anything (no partner, tampons or douche) in your vagina for 6 weeks. -You may walk for exercise for the first 6 weeks then gradually return to your usual activities.   -Please do not drive for 1 week if you have no stitches and for 2 weeks if you have stitches or underwent a  delivery.    -You may take baths or shower per your preference.   -Please look at your bust (breasts) in the mirror daily and call for redness or tenderness or increased warmth.   -Please call us for temperature > 100.4*F or 38* C, worsening pain or a foul discharge. Discharge Medications:   Prenatal vitamin daily for 6 months or the duration of nursing whichever is longer.   Motrin 600 mg orally every 6 hours as needed for pain  Tylenol (over the counter) per bottle directions as needed for pain: do NOT use with percocet  Hydrocortisone cream 1% (over the counter) applied 1-2x daily to hemorrhoids as needed    Planned Readmission: No

## 2023-07-22 NOTE — OB LABOR/OXYTOCIN SAFETY PROGRESS
Oxytocin Safety Progress Check Note - Ghulam Higgins 33 y.o. female MRN: [id number]    Unit/Bed#: -01 Encounter: [id number]    Dose (georgette-units/min) Oxytocin: 6 georgette-units/min (reduced by half due to decelerations, per Dr. Ham Broderick)  Contraction Frequency (minutes): 2-3  Contraction Quality: Moderate  Tachysystole: No   Cervical Dilation: 10        Cervical Effacement: 100  Fetal Station: 1  Baseline Rate: 130 bpm  Fetal Heart Rate: 130 BPM  FHR Category: Category II           Patient feeling intermittent rectal pressure. SVE as above. Prolonged deceleration after SVE with teodoro in 70s, return to 110s after repositioning. Pit @6, now held. Plan to allow 20min of recovery with category 1 strip, then plan to begin pushing. Discussed with Dr. Paramjit Llanos.      Vital Signs:   Vitals:    07/22/23 0115   BP: 101/51   Pulse: 65   Resp:    Temp:    SpO2: 100%          Rashaad Sweeney MD 7/22/2023 1:37 AM

## 2023-07-22 NOTE — ANESTHESIA POSTPROCEDURE EVALUATION
Post-Op Assessment Note    CV Status:  Stable    Pain management: adequate     Mental Status:  Alert and awake   Hydration Status:  Stable   PONV Controlled:  Controlled   Airway Patency:  Patent      Post Op Vitals Reviewed: Yes      Staff: CRNA   Comments: Epidural catheter removed without difficulty, tip intact, site clean    Post-op block assessment: catheter intact and no complications      No notable events documented.     BP      Temp      Pulse     Resp      SpO2

## 2023-07-22 NOTE — OB LABOR/OXYTOCIN SAFETY PROGRESS
Oxytocin Safety Progress Check Note - Malissa Barrera 33 y.o. female MRN: [id number]    Unit/Bed#: -01 Encounter: [id number]    Dose (georgette-units/min) Oxytocin: 6 georgette-units/min (reduced by half due to decelerations, per Dr. Awilda Calderon)  Contraction Frequency (minutes): 2.5-3  Contraction Quality: Moderate  Tachysystole: No   Cervical Dilation: 8        Cervical Effacement: 80  Fetal Station: -2  Baseline Rate: 135 bpm  Fetal Heart Rate: 128 BPM  FHR Category: Category II               Vital Signs:   Vitals:    07/21/23 2300   BP: 112/66   Pulse:    Resp:    Temp:    SpO2:        Notes/comments:   FHT with recurrent late decelerations in the setting of moderate variability. Contractions every 2-3 min with adequate MVUs. SVE remains 8/80/-2, though fetus is better applied to cervix and patient feels increased pressure. Maternal repositioning initiated. Will consider decreasing pitocin if needed for fetal resuscitation.          Jerryl Sicard, MD 7/21/2023 11:04 PM

## 2023-07-23 VITALS
TEMPERATURE: 98 F | DIASTOLIC BLOOD PRESSURE: 72 MMHG | BODY MASS INDEX: 38.45 KG/M2 | RESPIRATION RATE: 16 BRPM | HEIGHT: 67 IN | WEIGHT: 245 LBS | SYSTOLIC BLOOD PRESSURE: 116 MMHG | HEART RATE: 84 BPM | OXYGEN SATURATION: 98 %

## 2023-07-23 PROCEDURE — 99024 POSTOP FOLLOW-UP VISIT: CPT | Performed by: STUDENT IN AN ORGANIZED HEALTH CARE EDUCATION/TRAINING PROGRAM

## 2023-07-23 RX ORDER — DOCUSATE SODIUM 100 MG/1
100 CAPSULE, LIQUID FILLED ORAL 2 TIMES DAILY
Refills: 0
Start: 2023-07-23

## 2023-07-23 RX ORDER — IBUPROFEN 600 MG/1
600 TABLET ORAL EVERY 6 HOURS SCHEDULED
Qty: 30 TABLET | Refills: 0
Start: 2023-07-23

## 2023-07-23 RX ORDER — ACETAMINOPHEN 325 MG/1
975 TABLET ORAL EVERY 6 HOURS SCHEDULED
Refills: 0
Start: 2023-07-23

## 2023-07-23 RX ORDER — DIAPER,BRIEF,INFANT-TODD,DISP
1 EACH MISCELLANEOUS DAILY PRN
Qty: 30 G | Refills: 0
Start: 2023-07-23

## 2023-07-23 RX ADMIN — DOCUSATE SODIUM 100 MG: 100 CAPSULE, LIQUID FILLED ORAL at 10:48

## 2023-07-23 RX ADMIN — IBUPROFEN 600 MG: 600 TABLET, FILM COATED ORAL at 00:31

## 2023-07-23 RX ADMIN — ACETAMINOPHEN 650 MG: 325 TABLET, FILM COATED ORAL at 10:48

## 2023-07-23 RX ADMIN — IBUPROFEN 600 MG: 600 TABLET, FILM COATED ORAL at 06:18

## 2023-07-23 RX ADMIN — SENNOSIDES 8.6 MG: 8.6 TABLET, FILM COATED ORAL at 10:48

## 2023-07-23 RX ADMIN — ACETAMINOPHEN 650 MG: 325 TABLET, FILM COATED ORAL at 03:24

## 2023-07-23 NOTE — PROGRESS NOTES
Obstetrics Progress Note  Ritchie Valle 33 y.o. female MRN: [id number]  Unit/Bed#: -01 Encounter: [id number]    Assessment/Plan:  Postpartum Day #1 s/p . Stable. *  (vaginal birth after )  Assessment & Plan      Continue routine post partum care  Pain:  Tylenol/Motrin as needed  Lochia: Continue to observe  OOB: continue to encourage ambulation  Flatus : We will monitor  Breastfeeding: []  Contraception: []  Baby in: room  Dispo: anticipate  postpartum day 1 versus 2        Desires discharge today, reviewed  estelita-care, pelvic rest, and call/return precautions (increased bleeding, pain, persistent headache, chest pain, shortness of breath, fevers, etc.)        Subjective/Objective   Chief Complaint:   Post delivery     Subjective:   Pain and bleeding wnl  Feeding without issues  Desires discharge today      Objective:   Vitals:   Temp:  [97.7 °F (36.5 °C)-99.1 °F (37.3 °C)] 99.1 °F (37.3 °C)  HR:  [79-96] 92  Resp:  [16-18] 18  BP: (106-120)/(60-64) 109/60       Intake/Output Summary (Last 24 hours) at 2023 0646  Last data filed at 2023 0830  Gross per 24 hour   Intake --   Output 250 ml   Net -250 ml       Lab Results   Component Value Date    WBC 11.58 (H) 2023    HGB 11.1 (L) 2023    HCT 34.4 (L) 2023    MCV 90 2023     2023       Physical Exam:   General: alert and oriented x3, in no apparent distress  Pulmonary: normal effort  Abdomen: Soft, non-tender, non-distended, no rebound or guarding.  Uterine fundus firm and non-tender, below umbilicus  Extremities: no erythema, trace symmetric edema    2023, 6:46 AM

## 2023-07-23 NOTE — PLAN OF CARE
Problem: PAIN - ADULT  Goal: Verbalizes/displays adequate comfort level or baseline comfort level  Description: Interventions:  - Encourage patient to monitor pain and request assistance  - Assess pain using appropriate pain scale  - Administer analgesics based on type and severity of pain and evaluate response  - Implement non-pharmacological measures as appropriate and evaluate response  - Consider cultural and social influences on pain and pain management  - Notify physician/advanced practitioner if interventions unsuccessful or patient reports new pain  7/22/2023 2204 by Nikolas Maza RN  Outcome: Progressing  7/22/2023 2204 by Nikolas Maza RN  Outcome: Progressing     Problem: INFECTION - ADULT  Goal: Absence or prevention of progression during hospitalization  Description: INTERVENTIONS:  - Assess and monitor for signs and symptoms of infection  - Monitor lab/diagnostic results  - Monitor all insertion sites, i.e. indwelling lines, tubes, and drains  - Monitor endotracheal if appropriate and nasal secretions for changes in amount and color  - [place] appropriate cooling/warming therapies per order  - Administer medications as ordered  - Instruct and encourage patient and family to use good hand hygiene technique  - Identify and instruct in appropriate isolation precautions for identified infection/condition  7/22/2023 2204 by Nikolas Maza RN  Outcome: Progressing  7/22/2023 2204 by Nikolas Maza RN  Outcome: Progressing     Problem: SAFETY ADULT  Goal: Patient will remain free of falls  Description: INTERVENTIONS:  - Educate patient/family on patient safety including physical limitations  - Instruct patient to call for assistance with activity   - Consult OT/PT to assist with strengthening/mobility   - Keep Call bell within reach  - Keep bed low and locked with side rails adjusted as appropriate  - Keep care items and personal belongings within reach  - Initiate and maintain comfort rounds  7/22/2023 2204 by Annette Robledo RN  Outcome: Progressing  7/22/2023 2204 by Annette Robledo RN  Outcome: Progressing  Goal: Maintain or return to baseline ADL function  Description: INTERVENTIONS:  -  Assess patient's ability to carry out ADLs; assess patient's baseline for ADL function and identify physical deficits which impact ability to perform ADLs (bathing, care of mouth/teeth, toileting, grooming, dressing, etc.)  - Assess/evaluate cause of self-care deficits   - Assess range of motion  - Assess patient's mobility; develop plan if impaired  - Assess patient's need for assistive devices and provide as appropriate  - Encourage maximum independence but intervene and supervise when necessary  - Involve family in performance of ADLs  - Assess for home care needs following discharge   - Consider OT consult to assist with ADL evaluation and planning for discharge  - Provide patient education as appropriate  7/22/2023 2204 by Annette Robledo RN  Outcome: Progressing  7/22/2023 2204 by Annette Robledo RN  Outcome: Progressing  Goal: Maintains/Returns to pre admission functional level  Description: INTERVENTIONS:  - Perform BMAT or MOVE assessment daily.   - Set and communicate daily mobility goal to care team and patient/family/caregiver. - Collaborate with rehabilitation services on mobility goals if consulted  7/22/2023 2204 by Annette Robledo RN  Outcome: Progressing  7/22/2023 2204 by Annette Robledo RN  Outcome: Progressing     Problem: Knowledge Deficit  Goal: Patient/family/caregiver demonstrates understanding of disease process, treatment plan, medications, and discharge instructions  Description: Complete learning assessment and assess knowledge base.   Interventions:  - Provide teaching at level of understanding  - Provide teaching via preferred learning methods  7/22/2023 2204 by Annette Robledo RN  Outcome: Progressing  7/22/2023 2204 by Annette Robledo RN  Outcome: Progressing     Problem: DISCHARGE PLANNING  Goal: Discharge to home or other facility with appropriate resources  Description: INTERVENTIONS:  - Identify barriers to discharge w/patient and caregiver  - Arrange for needed discharge resources and transportation as appropriate  - Identify discharge learning needs (meds, wound care, etc.)  - Arrange for interpretive services to assist at discharge as needed  - Refer to Case Management Department for coordinating discharge planning if the patient needs post-hospital services based on physician/advanced practitioner order or complex needs related to functional status, cognitive ability, or social support system  2023 by Caryle Amber, RN  Outcome: Progressing  2023 by Caryle Amber, RN  Outcome: Progressing     Problem: POSTPARTUM  Goal: Experiences normal postpartum course  Description: INTERVENTIONS:  - Monitor maternal vital signs  - Assess uterine involution and lochia  2023 by Caryle Amber, RN  Outcome: Progressing  2023 by Caryle Amber, RN  Outcome: Progressing  Goal: Appropriate maternal -  bonding  Description: INTERVENTIONS:  - Identify family support  - Assess for appropriate maternal/infant bonding   -Encourage maternal/infant bonding opportunities  - Referral to  or  as needed  2023 by Caryle Amber, RN  Outcome: Progressing  2023 by Caryle Amber, RN  Outcome: Progressing  Goal: Establishment of infant feeding pattern  Description: INTERVENTIONS:  - Assess breast/bottle feeding  - Refer to lactation as needed  2023 by Caryle Amber, RN  Outcome: Progressing  2023 by Caryle Amber, RN  Outcome: Progressing  Goal: Incision(s), wounds(s) or drain site(s) healing without S/S of infection  Description: INTERVENTIONS  - Assess and document dressing, incision, wound bed, drain sites and surrounding tissue  - Provide patient and family education  2023 by Gideon Becker RN  Outcome: Progressing  7/22/2023 2204 by Gideon Becker RN  Outcome: Progressing

## 2023-07-23 NOTE — PLAN OF CARE
Problem: PAIN - ADULT  Goal: Verbalizes/displays adequate comfort level or baseline comfort level  Description: Interventions:  - Encourage patient to monitor pain and request assistance  - Assess pain using appropriate pain scale  - Administer analgesics based on type and severity of pain and evaluate response  - Implement non-pharmacological measures as appropriate and evaluate response  - Consider cultural and social influences on pain and pain management  - Notify physician/advanced practitioner if interventions unsuccessful or patient reports new pain  Outcome: Progressing     Problem: INFECTION - ADULT  Goal: Absence or prevention of progression during hospitalization  Description: INTERVENTIONS:  - Assess and monitor for signs and symptoms of infection  - Monitor lab/diagnostic results  - Monitor all insertion sites, i.e. indwelling lines, tubes, and drains  - Monitor endotracheal if appropriate and nasal secretions for changes in amount and color  - Andrew appropriate cooling/warming therapies per order  - Administer medications as ordered  - Instruct and encourage patient and family to use good hand hygiene technique  - Identify and instruct in appropriate isolation precautions for identified infection/condition  Outcome: Progressing     Problem: SAFETY ADULT  Goal: Patient will remain free of falls  Description: INTERVENTIONS:  - Educate patient/family on patient safety including physical limitations  - Instruct patient to call for assistance with activity   - Consult OT/PT to assist with strengthening/mobility   - Keep Call bell within reach  - Keep bed low and locked with side rails adjusted as appropriate  - Keep care items and personal belongings within reach  - Initiate and maintain comfort rounds  Outcome: Progressing  Goal: Maintain or return to baseline ADL function  Description: INTERVENTIONS:  -  Assess patient's ability to carry out ADLs; assess patient's baseline for ADL function and identify physical deficits which impact ability to perform ADLs (bathing, care of mouth/teeth, toileting, grooming, dressing, etc.)  - Assess/evaluate cause of self-care deficits   - Assess range of motion  - Assess patient's mobility; develop plan if impaired  - Assess patient's need for assistive devices and provide as appropriate  - Encourage maximum independence but intervene and supervise when necessary  - Involve family in performance of ADLs  - Assess for home care needs following discharge   - Consider OT consult to assist with ADL evaluation and planning for discharge  - Provide patient education as appropriate  Outcome: Progressing  Goal: Maintains/Returns to pre admission functional level  Description: INTERVENTIONS:  - Perform BMAT or MOVE assessment daily.   - Set and communicate daily mobility goal to care team and patient/family/caregiver. - Collaborate with rehabilitation services on mobility goals if consulted  Outcome: Progressing     Problem: Knowledge Deficit  Goal: Patient/family/caregiver demonstrates understanding of disease process, treatment plan, medications, and discharge instructions  Description: Complete learning assessment and assess knowledge base.   Interventions:  - Provide teaching at level of understanding  - Provide teaching via preferred learning methods  Outcome: Progressing     Problem: DISCHARGE PLANNING  Goal: Discharge to home or other facility with appropriate resources  Description: INTERVENTIONS:  - Identify barriers to discharge w/patient and caregiver  - Arrange for needed discharge resources and transportation as appropriate  - Identify discharge learning needs (meds, wound care, etc.)  - Arrange for interpretive services to assist at discharge as needed  - Refer to Case Management Department for coordinating discharge planning if the patient needs post-hospital services based on physician/advanced practitioner order or complex needs related to functional status, cognitive ability, or social support system  Outcome: Progressing     Problem: POSTPARTUM  Goal: Experiences normal postpartum course  Description: INTERVENTIONS:  - Monitor maternal vital signs  - Assess uterine involution and lochia  Outcome: Progressing  Goal: Appropriate maternal -  bonding  Description: INTERVENTIONS:  - Identify family support  - Assess for appropriate maternal/infant bonding   -Encourage maternal/infant bonding opportunities  - Referral to  or  as needed  Outcome: Progressing  Goal: Establishment of infant feeding pattern  Description: INTERVENTIONS:  - Assess breast/bottle feeding  - Refer to lactation as needed  Outcome: Progressing  Goal: Incision(s), wounds(s) or drain site(s) healing without S/S of infection  Description: INTERVENTIONS  - Assess and document dressing, incision, wound bed, drain sites and surrounding tissue    Outcome: Progressing

## 2023-07-23 NOTE — PLAN OF CARE
Problem: PAIN - ADULT  Goal: Verbalizes/displays adequate comfort level or baseline comfort level  Description: Interventions:  - Encourage patient to monitor pain and request assistance  - Assess pain using appropriate pain scale  - Administer analgesics based on type and severity of pain and evaluate response  - Implement non-pharmacological measures as appropriate and evaluate response  - Consider cultural and social influences on pain and pain management  - Notify physician/advanced practitioner if interventions unsuccessful or patient reports new pain  Outcome: Adequate for Discharge     Problem: INFECTION - ADULT  Goal: Absence or prevention of progression during hospitalization  Description: INTERVENTIONS:  - Assess and monitor for signs and symptoms of infection  - Monitor lab/diagnostic results  - Monitor all insertion sites, i.e. indwelling lines, tubes, and drains  - Monitor endotracheal if appropriate and nasal secretions for changes in amount and color  - [place] appropriate cooling/warming therapies per order  - Administer medications as ordered  - Instruct and encourage patient and family to use good hand hygiene technique  - Identify and instruct in appropriate isolation precautions for identified infection/condition  Outcome: Adequate for Discharge     Problem: SAFETY ADULT  Goal: Patient will remain free of falls  Description: INTERVENTIONS:  - Educate patient/family on patient safety including physical limitations  - Instruct patient to call for assistance with activity   - Consult OT/PT to assist with strengthening/mobility   - Keep Call bell within reach  - Keep bed low and locked with side rails adjusted as appropriate  - Keep care items and personal belongings within reach  - Initiate and maintain comfort rounds  Outcome: Adequate for Discharge  Goal: Maintain or return to baseline ADL function  Description: INTERVENTIONS:  -  Assess patient's ability to carry out ADLs; assess patient's baseline for ADL function and identify physical deficits which impact ability to perform ADLs (bathing, care of mouth/teeth, toileting, grooming, dressing, etc.)  - Assess/evaluate cause of self-care deficits   - Assess range of motion  - Assess patient's mobility; develop plan if impaired  - Assess patient's need for assistive devices and provide as appropriate  - Encourage maximum independence but intervene and supervise when necessary  - Involve family in performance of ADLs  - Assess for home care needs following discharge   - Consider OT consult to assist with ADL evaluation and planning for discharge  - Provide patient education as appropriate  Outcome: Adequate for Discharge  Goal: Maintains/Returns to pre admission functional level  Description: INTERVENTIONS:  - Perform BMAT or MOVE assessment daily.   - Set and communicate daily mobility goal to care team and patient/family/caregiver. - Collaborate with rehabilitation services on mobility goals if consulted  Outcome: Adequate for Discharge     Problem: Knowledge Deficit  Goal: Patient/family/caregiver demonstrates understanding of disease process, treatment plan, medications, and discharge instructions  Description: Complete learning assessment and assess knowledge base.   Interventions:  - Provide teaching at level of understanding  - Provide teaching via preferred learning methods  Outcome: Adequate for Discharge     Problem: DISCHARGE PLANNING  Goal: Discharge to home or other facility with appropriate resources  Description: INTERVENTIONS:  - Identify barriers to discharge w/patient and caregiver  - Arrange for needed discharge resources and transportation as appropriate  - Identify discharge learning needs (meds, wound care, etc.)  - Arrange for interpretive services to assist at discharge as needed  - Refer to Case Management Department for coordinating discharge planning if the patient needs post-hospital services based on physician/advanced practitioner order or complex needs related to functional status, cognitive ability, or social support system  Outcome: Adequate for Discharge     Problem: POSTPARTUM  Goal: Experiences normal postpartum course  Description: INTERVENTIONS:  - Monitor maternal vital signs  - Assess uterine involution and lochia  Outcome: Adequate for Discharge  Goal: Appropriate maternal -  bonding  Description: INTERVENTIONS:  - Identify family support  - Assess for appropriate maternal/infant bonding   -Encourage maternal/infant bonding opportunities  - Referral to  or  as needed  Outcome: Adequate for Discharge  Goal: Establishment of infant feeding pattern  Description: INTERVENTIONS:  - Assess breast/bottle feeding  - Refer to lactation as needed  Outcome: Adequate for Discharge  Goal: Incision(s), wounds(s) or drain site(s) healing without S/S of infection  Description: INTERVENTIONS  - Assess and document dressing, incision, wound bed, drain sites and surrounding tissue  - Provide patient and family education  - Perform skin care/dressing changes   Outcome: Adequate for Discharge

## 2023-07-23 NOTE — LACTATION NOTE
This note was copied from a baby's chart. CONSULT - LACTATION  Baby Boy Browning (Marissa) 0 days male MRN: [id number]    [address] Room / Bed: (N)/(N) Encounter: [id number]    Maternal Information     MOTHER:  Braden Solorio  Maternal Age: 33 y.o.   OB History: # 1 - Date: , Sex: Female, Weight: 4252 g (9 lb 6 oz), GA: 41w3d, Delivery: , Low Transverse, Apgar1: None, Apgar5: None, Living: Living, Birth Comments: None    # 2 - Date: , Sex: Male, Weight: 4394 g (9 lb 11 oz), GA: 40w5d, Delivery: , Spontaneous, Apgar1: None, Apgar5: None, Living: Living, Birth Comments: None    # 3 - Date: 23, Sex: Male, Weight: 4315 g (9 lb 8.2 oz), GA: 41w0d, Delivery: Vaginal, Spontaneous, Apgar1: 9, Apgar5: 9, Living: Living, Birth Comments: None   Previouse breast reduction surgery? No    Lactation history:   Has patient previously breast fed: Yes   How long had patient previously breast fed: 4yo and 1yo for 1 year each.    Previous breast feeding complications: None     Past Surgical History:   Procedure Laterality Date   •  SECTION          Birth information:  YOB: 2024   Time of birth: 3:02 AM   Sex: male   Delivery type: Vaginal, Spontaneous   Birth Weight: 4315 g (9 lb 8.2 oz)   Percent of Weight Change: 0%     Gestational Age: 37w0d   [unfilled]    Assessment     Breast and nipple assessment: normal assessment    [place] Assessment: normal assessment    Feeding assessment: sleepy baby  LATCH:  Latch: Repeated attempts, hold nipple in mouth, stimulate to suck   Audible Swallowing: A few with stimulation   Type of Nipple: Everted (After stimulation)   Comfort (Breast/Nipple): Soft/non-tender   Hold (Positioning): No assist from staff, mother able to position/hold infant   LATCH Score: 8          Feeding recommendations:  breast feed on demand     Met with parents and provided them with the Ready Set Baby and the Discharge Breastfeeding Booklets. Discussed Skin to Skin contact and benefits to mom and baby. Feeding cues and what that means for recognizing infant's hunger reviewed. Avoidance of pacifiers for the first month discussed. Talked about exclusive breastfeeding for the first 6 months. Positioning and latch reviewed as well as showing images of other feeding positions. Discussed the properties of a good latch in any position. Reviewed hand/manual expression. Ida was able to position her baby at the breast independently. Baby is presently sleepy, placed at both sides and took a few sucks and fell asleep. Encouraged them to look over the Discharge Breastfeeding Booklet and call with questions. Charissa Palacio is an experienced breastfeeding mom. Instructed parents to call with additional questions or for breastfeeding support as needed. Phone number provided.         Jaylen Bui RN 7/22/2023 8:38 PM

## 2023-07-29 LAB — PLACENTA IN STORAGE: NORMAL

## 2024-10-22 NOTE — PROGRESS NOTES
Assessment & Plan   Diagnoses and all orders for this visit:    Amenorrhea  -     AMB US OB < 14 weeks single or first gestation level 1    9 weeks gestation of pregnancy  -     Ambulatory Referral to Maternal Fetal Medicine; Future    Discussion  Viable IUP at  9w4d - HERNÁN established to 25 based on ultrasound today (first period in 3 years)  Encouraged the flu vaccine and COVID booster in pregnancy; Encouraged infection prevention/handwashing. Declines flu vac today.  Referral placed for MFM to schedule early anatomy scan and review options for genetic screening  RTO in 2 weeks for OB INTAKE with OB nurse navigator  RTO in 4 weeks for INITIAL PRENATAL - routine ob check with physical exam or sooner if needed    Subjective     HPI  29 y.o.  who presents with amenorrhea. LMP is an exact date of 24. This makes her 9w6d corresponding to an HERNÁN of 25.   First period over the last couple of years;  This is a planned and welcomed pregnancy.  (+) n - consistent but lessoning at this time; Denies v/HA/cramping/vb/lof/edema/dv/smoking; no urine dip - she left office unable to leave a urine sample today  PNVs + DHA - tolerating daily; r/gerson 1 mg folic acid and DHA daily  Patient refused TV u/s today - obtained all images I could TA;    TA us done - single viable IUP measuring 28.2 mm by CRL at 9w4d; (+) FHM of 175 bpm; HERNÁN will be 25 based on ultrasound today      OB History    Para Term  AB Living   4 3 3     3   SAB IAB Ectopic Multiple Live Births         0 3      # Outcome Date GA Lbr Matteo/2nd Weight Sex Type Anes PTL Lv   4 Current            3 Term 23 41w0d / 01:26 4315 g (9 lb 8.2 oz) M Vag-Spont EPI  [facility]   2 Term  40w5d  4394 g (9 lb 11 oz) M  EPI  [facility]   1 Term 2019 41w3d  4252 g (9 lb 6 oz) F CS-LTranv   [facility]      Complications: Failure to Progress in Second Stage      Obstetric Comments   : 2019 prim LTCS F;   M;   M    Hgb electrophoresis  "WNL       History reviewed. No pertinent past medical history.      Current Outpatient Medications:     ferrous gluconate (FERGON) 324 mg tablet, Take 324 mg by mouth daily with breakfast, Disp: , Rfl:     Prenatal Multivit-Min-Fe-FA (PRE- PO), Take by mouth, Disp: , Rfl:     No Known Allergies    Objective   Vitals:    10/24/24 0839   BP: 102/68   BP Location: Left arm   Patient Position: Sitting   Cuff Size: Standard   Weight: 90.3 kg (199 lb)   Height: 5' 7\" (1.702 m)     Physical Exam  Vitals reviewed.   Constitutional:       General: She is not in acute distress.     Appearance: Normal appearance. She is not ill-appearing, toxic-appearing or diaphoretic.   HENT:      Head: Normocephalic and atraumatic.   Eyes:      Conjunctiva/sclera: Conjunctivae normal.   Neurological:      Mental Status: She is alert and oriented to person, place, and time.   Psychiatric:         Mood and Affect: Mood normal.         Behavior: Behavior normal.         Thought Content: Thought content normal.         Judgment: Judgment normal.         "

## 2024-10-24 ENCOUNTER — TELEPHONE (OUTPATIENT)
Age: 29
End: 2024-10-24

## 2024-10-24 ENCOUNTER — ULTRASOUND (OUTPATIENT)
Dept: OBGYN CLINIC | Facility: CLINIC | Age: 29
End: 2024-10-24
Payer: COMMERCIAL

## 2024-10-24 ENCOUNTER — TELEPHONE (OUTPATIENT)
Dept: OBGYN CLINIC | Facility: CLINIC | Age: 29
End: 2024-10-24

## 2024-10-24 VITALS
WEIGHT: 199 LBS | HEIGHT: 67 IN | SYSTOLIC BLOOD PRESSURE: 102 MMHG | DIASTOLIC BLOOD PRESSURE: 68 MMHG | BODY MASS INDEX: 31.23 KG/M2

## 2024-10-24 DIAGNOSIS — N91.2 AMENORRHEA: Primary | ICD-10-CM

## 2024-10-24 DIAGNOSIS — Z3A.09 9 WEEKS GESTATION OF PREGNANCY: ICD-10-CM

## 2024-10-24 PROCEDURE — 99214 OFFICE O/P EST MOD 30 MIN: CPT | Performed by: PHYSICIAN ASSISTANT

## 2024-10-24 PROCEDURE — 76801 OB US < 14 WKS SINGLE FETUS: CPT | Performed by: PHYSICIAN ASSISTANT

## 2024-10-24 RX ORDER — FERROUS GLUCONATE 324(38)MG
324 TABLET ORAL
COMMUNITY

## 2024-10-24 NOTE — TELEPHONE ENCOUNTER
Attempted to schedule patient for her OB intake. Patient was adamant that she did not want the intake appointment. Offered telephone intake appointment, patient still refused. Spoke to provider who saw patient for her D&V Ashish Ramirez to make her aware. Provider was okay with patient's decision and will send prenatal labs to patient.

## 2024-10-29 ENCOUNTER — TELEPHONE (OUTPATIENT)
Dept: OBGYN CLINIC | Facility: CLINIC | Age: 29
End: 2024-10-29

## 2024-11-20 NOTE — PROGRESS NOTES
VISIT 29 yr old  at 14w1d: She refused ob intake visit - history reviewed, blue folder provided, appointment consent signed  (+) n - comes and goes - getting better; Denies v/HA/cramping/vb/lof/edema/dv/smoking; urine neg/neg  Labs not ordered or done yet due to refusal of meeting with ob nurse navigator; PNC - needs to scheduled level 2 ultrasound;  PNVs + DHA - tolerating daily  No FM yet    Physical exam done; Pap done; Patient declines C/G cultures.  Initial ob labs reviewed and ordered - varicella not ordered - not immune from last pregnancy and did not receive a booster; Reviewed importance of avoid person with rashes and shingles outbreak - can consider booster PP. At first patient refused STI labs - 100% positive will be negative. I expressed my understanding to her view point and explained without negative serology for mom, baby will be tested. This is very upsetting - she feels strongly about not wanting to repeat testing from last pregnancy but she does not want baby to undergo testing so ultimately agreed to labs; reviewed ok to defer STI cultures with no concern.  Encouraged hydration. Encouraged infection prevention/handwashing. Declined flu vaccine  RTO in 4 weeks for routine ob check or sooner if needed

## 2024-11-25 ENCOUNTER — INITIAL PRENATAL (OUTPATIENT)
Dept: OBGYN CLINIC | Facility: CLINIC | Age: 29
End: 2024-11-25

## 2024-11-25 VITALS — DIASTOLIC BLOOD PRESSURE: 72 MMHG | WEIGHT: 202 LBS | SYSTOLIC BLOOD PRESSURE: 108 MMHG | BODY MASS INDEX: 31.64 KG/M2

## 2024-11-25 DIAGNOSIS — Z34.82 ENCOUNTER FOR SUPERVISION OF NORMAL PREGNANCY IN MULTIGRAVIDA IN SECOND TRIMESTER: Primary | ICD-10-CM

## 2024-11-25 PROCEDURE — PNV: Performed by: PHYSICIAN ASSISTANT

## 2024-11-25 PROCEDURE — G0145 SCR C/V CYTO,THINLAYER,RESCR: HCPCS | Performed by: PHYSICIAN ASSISTANT

## 2024-11-25 PROCEDURE — G0476 HPV COMBO ASSAY CA SCREEN: HCPCS | Performed by: PHYSICIAN ASSISTANT

## 2024-11-26 LAB
HPV HR 12 DNA CVX QL NAA+PROBE: NEGATIVE
HPV16 DNA CVX QL NAA+PROBE: NEGATIVE
HPV18 DNA CVX QL NAA+PROBE: NEGATIVE

## 2024-11-27 ENCOUNTER — RESULTS FOLLOW-UP (OUTPATIENT)
Dept: OBGYN CLINIC | Facility: CLINIC | Age: 29
End: 2024-11-27

## 2024-12-02 LAB
LAB AP GYN PRIMARY INTERPRETATION: NORMAL
LAB AP LMP: NORMAL
Lab: NORMAL

## 2025-01-04 ENCOUNTER — APPOINTMENT (OUTPATIENT)
Dept: LAB | Facility: MEDICAL CENTER | Age: 30
End: 2025-01-04
Payer: COMMERCIAL

## 2025-01-04 DIAGNOSIS — Z34.82 ENCOUNTER FOR SUPERVISION OF NORMAL PREGNANCY IN MULTIGRAVIDA IN SECOND TRIMESTER: ICD-10-CM

## 2025-01-04 LAB
ABO GROUP BLD: NORMAL
BACTERIA UR QL AUTO: ABNORMAL /HPF
BASOPHILS # BLD AUTO: 0.03 THOUSANDS/ΜL (ref 0–0.1)
BASOPHILS NFR BLD AUTO: 0 % (ref 0–1)
BILIRUB UR QL STRIP: NEGATIVE
BLD GP AB SCN SERPL QL: NEGATIVE
CLARITY UR: CLEAR
COLOR UR: YELLOW
EOSINOPHIL # BLD AUTO: 0.16 THOUSAND/ΜL (ref 0–0.61)
EOSINOPHIL NFR BLD AUTO: 2 % (ref 0–6)
ERYTHROCYTE [DISTWIDTH] IN BLOOD BY AUTOMATED COUNT: 12.8 % (ref 11.6–15.1)
GLUCOSE UR STRIP-MCNC: NEGATIVE MG/DL
HBV SURFACE AG SER QL: NORMAL
HCT VFR BLD AUTO: 37.8 % (ref 34.8–46.1)
HCV AB SER QL: NORMAL
HGB BLD-MCNC: 12.2 G/DL (ref 11.5–15.4)
HGB UR QL STRIP.AUTO: NEGATIVE
HIV 1+2 AB+HIV1 P24 AG SERPL QL IA: NORMAL
HIV 2 AB SERPL QL IA: NORMAL
HIV1 AB SERPL QL IA: NORMAL
HIV1 P24 AG SERPL QL IA: NORMAL
IMM GRANULOCYTES # BLD AUTO: 0.07 THOUSAND/UL (ref 0–0.2)
IMM GRANULOCYTES NFR BLD AUTO: 1 % (ref 0–2)
KETONES UR STRIP-MCNC: NEGATIVE MG/DL
LEUKOCYTE ESTERASE UR QL STRIP: ABNORMAL
LYMPHOCYTES # BLD AUTO: 2.87 THOUSANDS/ΜL (ref 0.6–4.47)
LYMPHOCYTES NFR BLD AUTO: 27 % (ref 14–44)
MCH RBC QN AUTO: 30 PG (ref 26.8–34.3)
MCHC RBC AUTO-ENTMCNC: 32.3 G/DL (ref 31.4–37.4)
MCV RBC AUTO: 93 FL (ref 82–98)
MONOCYTES # BLD AUTO: 0.61 THOUSAND/ΜL (ref 0.17–1.22)
MONOCYTES NFR BLD AUTO: 6 % (ref 4–12)
MUCOUS THREADS UR QL AUTO: ABNORMAL
NEUTROPHILS # BLD AUTO: 7.08 THOUSANDS/ΜL (ref 1.85–7.62)
NEUTS SEG NFR BLD AUTO: 64 % (ref 43–75)
NITRITE UR QL STRIP: NEGATIVE
NON-SQ EPI CELLS URNS QL MICRO: ABNORMAL /HPF
NRBC BLD AUTO-RTO: 0 /100 WBCS
PH UR STRIP.AUTO: 6.5 [PH]
PLATELET # BLD AUTO: 213 THOUSANDS/UL (ref 149–390)
PMV BLD AUTO: 11.9 FL (ref 8.9–12.7)
PROT UR STRIP-MCNC: ABNORMAL MG/DL
RBC # BLD AUTO: 4.07 MILLION/UL (ref 3.81–5.12)
RBC #/AREA URNS AUTO: ABNORMAL /HPF
RH BLD: POSITIVE
RUBV IGG SERPL IA-ACNC: 28.3 IU/ML
SP GR UR STRIP.AUTO: 1.02 (ref 1–1.03)
SPECIMEN EXPIRATION DATE: NORMAL
TREPONEMA PALLIDUM IGG+IGM AB [PRESENCE] IN SERUM OR PLASMA BY IMMUNOASSAY: NORMAL
UROBILINOGEN UR STRIP-ACNC: <2 MG/DL
WBC # BLD AUTO: 10.82 THOUSAND/UL (ref 4.31–10.16)
WBC #/AREA URNS AUTO: ABNORMAL /HPF

## 2025-01-04 PROCEDURE — 86900 BLOOD TYPING SEROLOGIC ABO: CPT

## 2025-01-04 PROCEDURE — 86762 RUBELLA ANTIBODY: CPT

## 2025-01-04 PROCEDURE — 87086 URINE CULTURE/COLONY COUNT: CPT

## 2025-01-04 PROCEDURE — 87340 HEPATITIS B SURFACE AG IA: CPT

## 2025-01-04 PROCEDURE — 87186 SC STD MICRODIL/AGAR DIL: CPT

## 2025-01-04 PROCEDURE — 87389 HIV-1 AG W/HIV-1&-2 AB AG IA: CPT

## 2025-01-04 PROCEDURE — 36415 COLL VENOUS BLD VENIPUNCTURE: CPT

## 2025-01-04 PROCEDURE — 86901 BLOOD TYPING SEROLOGIC RH(D): CPT

## 2025-01-04 PROCEDURE — 85025 COMPLETE CBC W/AUTO DIFF WBC: CPT

## 2025-01-04 PROCEDURE — 86780 TREPONEMA PALLIDUM: CPT

## 2025-01-04 PROCEDURE — 81001 URINALYSIS AUTO W/SCOPE: CPT

## 2025-01-04 PROCEDURE — 86850 RBC ANTIBODY SCREEN: CPT

## 2025-01-04 PROCEDURE — 86803 HEPATITIS C AB TEST: CPT

## 2025-01-04 PROCEDURE — 87077 CULTURE AEROBIC IDENTIFY: CPT

## 2025-01-06 DIAGNOSIS — O23.42 URINARY TRACT INFECTION IN MOTHER DURING SECOND TRIMESTER OF PREGNANCY: ICD-10-CM

## 2025-01-06 DIAGNOSIS — Z3A.20 20 WEEKS GESTATION OF PREGNANCY: Primary | ICD-10-CM

## 2025-01-06 LAB — BACTERIA UR CULT: ABNORMAL

## 2025-01-06 RX ORDER — NITROFURANTOIN 25; 75 MG/1; MG/1
100 CAPSULE ORAL 2 TIMES DAILY
Qty: 10 CAPSULE | Refills: 0 | Status: SHIPPED | OUTPATIENT
Start: 2025-01-06 | End: 2025-01-11

## 2025-01-19 NOTE — PROGRESS NOTES
Please refer to the [facility] ultrasound report in Ob Procedures for additional information regarding today's visit

## 2025-01-20 ENCOUNTER — ROUTINE PRENATAL (OUTPATIENT)
Facility: HOSPITAL | Age: 30
End: 2025-01-20
Payer: COMMERCIAL

## 2025-01-20 VITALS
SYSTOLIC BLOOD PRESSURE: 112 MMHG | HEART RATE: 98 BPM | BODY MASS INDEX: 33.29 KG/M2 | OXYGEN SATURATION: 100 % | DIASTOLIC BLOOD PRESSURE: 68 MMHG | WEIGHT: 212.08 LBS | HEIGHT: 67 IN

## 2025-01-20 DIAGNOSIS — E66.811 CLASS 1 OBESITY: ICD-10-CM

## 2025-01-20 DIAGNOSIS — O34.219 HX SUCCESSFUL VBAC (VAGINAL BIRTH AFTER CESAREAN), CURRENTLY PREGNANT: ICD-10-CM

## 2025-01-20 DIAGNOSIS — O09.892 SHORT INTERVAL BETWEEN PREGNANCIES COMPLICATING PREGNANCY, ANTEPARTUM, SECOND TRIMESTER: ICD-10-CM

## 2025-01-20 DIAGNOSIS — Z3A.22 22 WEEKS GESTATION OF PREGNANCY: ICD-10-CM

## 2025-01-20 DIAGNOSIS — Z36.86 ENCOUNTER FOR ANTENATAL SCREENING FOR CERVICAL LENGTH: ICD-10-CM

## 2025-01-20 DIAGNOSIS — O99.212 MATERNAL OBESITY, ANTEPARTUM, SECOND TRIMESTER: Primary | ICD-10-CM

## 2025-01-20 DIAGNOSIS — Z34.82 ENCOUNTER FOR SUPERVISION OF NORMAL PREGNANCY IN MULTIGRAVIDA IN SECOND TRIMESTER: ICD-10-CM

## 2025-01-20 PROCEDURE — 76811 OB US DETAILED SNGL FETUS: CPT | Performed by: OBSTETRICS & GYNECOLOGY

## 2025-01-20 PROCEDURE — 99213 OFFICE O/P EST LOW 20 MIN: CPT | Performed by: OBSTETRICS & GYNECOLOGY

## 2025-01-20 NOTE — LETTER
January 22, 2025     Vivian Tobias MD  [address] [facility] [address]    Patient: Ida Browning   YOB: 1995   Date of Visit: 1/20/2025       Dear Dr. Tobias:    Thank you for referring Ida Browning to me for evaluation. Below are my notes for this consultation.    If you have questions, please do not hesitate to call me. I look forward to following your patient along with you.         Sincerely,        Sd Mayfield MD        CC: No Recipients    Sd Mayfield MD  1/20/2025  3:11 PM  Sign when Signing Visit  Please refer to the [facility] ultrasound report in Ob Procedures for additional information regarding today's visit

## 2025-01-22 NOTE — PROGRESS NOTES
OB/GYN  PN Visit  Ida Browning  [id number]  2025  8:23 AM  Yajaira Nicholson PA-C    S: 29 y.o.  22w3d here for PN visit. Pregnancy complicated by refusal of appropriate medical care.     OB complaints:  Denies c/o n/v/ha, no edema, no smoking, no DV.   No vb/lof  No cramping/ctxns or signs of PTL.    She reports that she is feeling well.    O:    Pre- Vitals    Flowsheet Row Most Recent Value   Prenatal Assessment    Fetal Heart Rate 140   Fundal Height (cm) 24 cm   Prenatal Vitals    Blood Pressure 110/70   Weight - Scale 97.5 kg (215 lb)   Urine Albumin/Glucose    Dilation/Effacement/Station    Vaginal Drainage    Draining Fluid No   Edema    LLE Edema None   RLE Edema None   Facial Edema None            Gen: no acute distress, nonlabored breathing.  OB exam completed: fundal height, +FHT.  Urine: -/-      A/P:    1. 23 weeks gestation of pregnancy  Assessment & Plan:  Overview:     Labs UTD, Ordered 3rd trimester labs.   Will plan at 26 weeks  Will plan urine repeat culture from office today.   Pap smear: 24 WNL neg HPV      GC/CT: refused  Prenatal Panel: done  Blood Type: A+ RhoGam not indicated   GBS: plan at 36 weeks    Genetic Testing: declines     Vaccines:  Tdap: will review at 27 weeks  Flu: declines     Birth Plan:  +/- epi  Yellow packet to be given and consents signed at 30 weeks.   Feeding Plan: breast   Breast pump: declines has 2 at home  Pediatrician: established   Contraception: likely plans Paraguard IUD  Ultrasounds: 25-normal fetal growth and anatomy, f/u 32 wk growth  Orders:  -     Urine culture  -     CBC and differential; Future; Expected date: 2025  -     RPR-Syphilis Screening (Total Syphilis IGG/IGM); Future  -     Anemia Panel w/Reflex, OB; Future  -     Glucose, 1H PG; Future      RTC in 4 weeks    Yajaira Nicholson PA-C  2025

## 2025-01-27 ENCOUNTER — ROUTINE PRENATAL (OUTPATIENT)
Dept: OBGYN CLINIC | Facility: CLINIC | Age: 30
End: 2025-01-27

## 2025-01-27 VITALS — WEIGHT: 215 LBS | SYSTOLIC BLOOD PRESSURE: 110 MMHG | BODY MASS INDEX: 33.67 KG/M2 | DIASTOLIC BLOOD PRESSURE: 70 MMHG

## 2025-01-27 DIAGNOSIS — Z3A.23 23 WEEKS GESTATION OF PREGNANCY: Primary | ICD-10-CM

## 2025-01-27 PROCEDURE — PNV: Performed by: PHYSICIAN ASSISTANT

## 2025-01-27 PROCEDURE — 87077 CULTURE AEROBIC IDENTIFY: CPT | Performed by: PHYSICIAN ASSISTANT

## 2025-01-27 PROCEDURE — 87086 URINE CULTURE/COLONY COUNT: CPT | Performed by: PHYSICIAN ASSISTANT

## 2025-01-27 PROCEDURE — 87186 SC STD MICRODIL/AGAR DIL: CPT | Performed by: PHYSICIAN ASSISTANT

## 2025-01-27 NOTE — ASSESSMENT & PLAN NOTE
Overview:     Labs UTD, Ordered 3rd trimester labs.   Will plan at 26 weeks  Will plan urine repeat culture from office today.   Pap smear: 24 WNL neg HPV      GC/CT: refused  Prenatal Panel: done  Blood Type: A+ RhoGam not indicated   GBS: plan at 36 weeks    Genetic Testing: declines     Vaccines:  Tdap: will review at 27 weeks  Flu: declines     Birth Plan:  +/- epi  Yellow packet to be given and consents signed at 30 weeks.   Feeding Plan: breast   Breast pump: declines has 2 at home  Pediatrician: established   Contraception: likely plans Paraguard IUD  Ultrasounds: 25-normal fetal growth and anatomy, f/u 32 wk growth

## 2025-01-29 LAB — BACTERIA UR CULT: ABNORMAL

## 2025-01-30 ENCOUNTER — RESULTS FOLLOW-UP (OUTPATIENT)
Dept: LABOR AND DELIVERY | Facility: HOSPITAL | Age: 30
End: 2025-01-30

## 2025-01-30 ENCOUNTER — NURSE TRIAGE (OUTPATIENT)
Age: 30
End: 2025-01-30

## 2025-01-30 DIAGNOSIS — N39.0 URINARY TRACT INFECTION WITH HEMATURIA, SITE UNSPECIFIED: ICD-10-CM

## 2025-01-30 DIAGNOSIS — R31.9 URINARY TRACT INFECTION WITH HEMATURIA, SITE UNSPECIFIED: ICD-10-CM

## 2025-01-30 DIAGNOSIS — N39.0 URINARY TRACT INFECTION WITH HEMATURIA, SITE UNSPECIFIED: Primary | ICD-10-CM

## 2025-01-30 DIAGNOSIS — R31.9 URINARY TRACT INFECTION WITH HEMATURIA, SITE UNSPECIFIED: Primary | ICD-10-CM

## 2025-01-30 RX ORDER — NITROFURANTOIN 25; 75 MG/1; MG/1
100 CAPSULE ORAL 2 TIMES DAILY
Qty: 10 CAPSULE | Refills: 0 | Status: SHIPPED | OUTPATIENT
Start: 2025-01-30 | End: 2025-01-30 | Stop reason: SDUPTHER

## 2025-01-30 RX ORDER — NITROFURANTOIN 25; 75 MG/1; MG/1
100 CAPSULE ORAL 2 TIMES DAILY
Qty: 10 CAPSULE | Refills: 0 | Status: SHIPPED | OUTPATIENT
Start: 2025-01-30 | End: 2025-02-04

## 2025-01-30 NOTE — TELEPHONE ENCOUNTER
"Pt calling in stating that she received medication to the wrong pharmacy, pt was notified of providers recommendations, Pt verbalized understanding. Pt confirmed pharmacy on file and would like the macrobid changed to the [facility] in [place] as the medication was sent to the [facility] in [place].     Order for macrobid was resent in to the correct pharmacy on file as per OBGYN Protocols.     Answer Assessment - Initial Assessment Questions  1. NAME of MEDICINE: \"What medicine(s) are you calling about?\"      Macrobid  2. QUESTION: \"What is your question?\" (e.g., double dose of medicine, side effect)      Pt would like the medication sent to another pharmacy. It was sent to [facility] in [place], pt would like the Macrobid to be sent to [facility] in [place].   3. PRESCRIBER: \"Who prescribed the medicine?\" Reason: if prescribed by specialist, call should be referred to that group.      Yajaira Nicholson PA-C    Protocols used: Medication Question Call-Adult-[place]    "

## 2025-02-04 DIAGNOSIS — R31.9 URINARY TRACT INFECTION WITH HEMATURIA, SITE UNSPECIFIED: ICD-10-CM

## 2025-02-04 DIAGNOSIS — N39.0 URINARY TRACT INFECTION WITH HEMATURIA, SITE UNSPECIFIED: ICD-10-CM

## 2025-02-04 RX ORDER — NITROFURANTOIN 25; 75 MG/1; MG/1
100 CAPSULE ORAL 2 TIMES DAILY
Qty: 10 CAPSULE | Refills: 0 | Status: SHIPPED | OUTPATIENT
Start: 2025-02-04 | End: 2025-02-09

## 2025-02-04 NOTE — TELEPHONE ENCOUNTER
Discussed provider note with patient.  It was discovered that medication was sent an phone in and due to being more than 3 days, will send to provider to reorder.  Pt verbalized understanding, no further questions or concerns at this time.

## 2025-03-10 ENCOUNTER — APPOINTMENT (OUTPATIENT)
Dept: LAB | Facility: CLINIC | Age: 30
End: 2025-03-10
Payer: COMMERCIAL

## 2025-03-10 DIAGNOSIS — Z3A.23 23 WEEKS GESTATION OF PREGNANCY: ICD-10-CM

## 2025-03-10 LAB
BASOPHILS # BLD AUTO: 0.01 THOUSANDS/ÂΜL (ref 0–0.1)
BASOPHILS NFR BLD AUTO: 0 % (ref 0–1)
EOSINOPHIL # BLD AUTO: 0.14 THOUSAND/ÂΜL (ref 0–0.61)
EOSINOPHIL NFR BLD AUTO: 1 % (ref 0–6)
ERYTHROCYTE [DISTWIDTH] IN BLOOD BY AUTOMATED COUNT: 13 % (ref 11.6–15.1)
GLUCOSE 1H P 50 G GLC PO SERPL-MCNC: 117 MG/DL (ref 70–134)
HCT VFR BLD AUTO: 32.6 % (ref 34.8–46.1)
HGB BLD-MCNC: 10.5 G/DL (ref 11.5–15.4)
IMM GRANULOCYTES # BLD AUTO: 0.09 THOUSAND/UL (ref 0–0.2)
IMM GRANULOCYTES NFR BLD AUTO: 1 % (ref 0–2)
LYMPHOCYTES # BLD AUTO: 1.81 THOUSANDS/ÂΜL (ref 0.6–4.47)
LYMPHOCYTES NFR BLD AUTO: 17 % (ref 14–44)
MCH RBC QN AUTO: 30.1 PG (ref 26.8–34.3)
MCHC RBC AUTO-ENTMCNC: 32.2 G/DL (ref 31.4–37.4)
MCV RBC AUTO: 93 FL (ref 82–98)
MONOCYTES # BLD AUTO: 0.75 THOUSAND/ÂΜL (ref 0.17–1.22)
MONOCYTES NFR BLD AUTO: 7 % (ref 4–12)
NEUTROPHILS # BLD AUTO: 7.93 THOUSANDS/ÂΜL (ref 1.85–7.62)
NEUTS SEG NFR BLD AUTO: 74 % (ref 43–75)
NRBC BLD AUTO-RTO: 0 /100 WBCS
PLATELET # BLD AUTO: 194 THOUSANDS/UL (ref 149–390)
PMV BLD AUTO: 11.8 FL (ref 8.9–12.7)
RBC # BLD AUTO: 3.49 MILLION/UL (ref 3.81–5.12)
WBC # BLD AUTO: 10.73 THOUSAND/UL (ref 4.31–10.16)

## 2025-03-10 PROCEDURE — 85025 COMPLETE CBC W/AUTO DIFF WBC: CPT

## 2025-03-10 PROCEDURE — 82950 GLUCOSE TEST: CPT

## 2025-03-10 PROCEDURE — 36415 COLL VENOUS BLD VENIPUNCTURE: CPT

## 2025-03-10 PROCEDURE — 86780 TREPONEMA PALLIDUM: CPT

## 2025-03-10 NOTE — ASSESSMENT & PLAN NOTE
Overview:     Labs UTD,HGB 10.5  Pap smear: 24 WNL neg HPV      GC/CT: refused  Prenatal Panel: done  Blood Type: A+ RhoGam not indicated   GBS: plan at 36 weeks    Genetic Testing: declines     Vaccines:  Tdap: declined 3/12/2025  Flu: declines     Birth Plan:  +/- epi  Yellow packet to be given and consents signed 3/12/25  Feeding Plan: breast   Breast pump: declines has 2 at home  Pediatrician: established   Contraception: likely plans Paraguard IUD  Ultrasounds: 25-normal fetal growth and anatomy, f/u 32 wk growth

## 2025-03-10 NOTE — PROGRESS NOTES
OB/GYN  PN Visit  Ida Browning  [id number]  3/12/2025  3:00 PM  GUS Coronel    S: 29 y.o.  29w1d here for PN visit.   She denies contractions. She denies leakage of fluid and vaginal bleeding.   She endorses good fetal movement.   She denies nausea, vomiting, headache, cramping, edema, and smoking.   Her pregnancy is complicated by history of c/s.   She reports feeling well.     O:  Pre-Tasneem Vitals      Flowsheet Row Most Recent Value   Prenatal Assessment    Fetal Heart Rate 140   Fundal Height (cm) 29 cm   Movement Present   Prenatal Vitals    Blood Pressure 118/64   Weight - Scale 105 kg (231 lb 3.2 oz)   Urine Albumin/Glucose    Dilation/Effacement/Station    Vaginal Drainage    Edema           Xb=807 kg (231 lb 3.2 oz); Body mass index is 36.21 kg/m².; TWG=14.6 kg (32 lb 3.2 oz)  Physical Exam    General: Well appearing, no distress  Respiratory: Unlabored breathing  Abdomen: Soft, gravid, nontender  Fundal Height: Appropriate for gestational age.   Extremities: Warm and well perfused.  Non tender.  OB exam completed: fundal height, +FHT.  Urine: -/-     A/P:    Problem List Items Addressed This Visit       History of  delivery, currently pregnant - Primary    Requesting TOLAC         Hx successful  (vaginal birth after ), currently pregnant    Hoping for  again  Request IOL at 40 weeks.          29 weeks gestation of pregnancy    Overview:     Labs UTD,HGB 10.5  Pap smear: 24 WNL neg HPV      GC/CT: refused  Prenatal Panel: done  Blood Type: A+ RhoGam not indicated   GBS: plan at 36 weeks    Genetic Testing: declines     Vaccines:  Tdap: declined 3/12/2025  Flu: declines     Birth Plan:  +/- epi  Yellow packet to be given and consents signed 3/12/25  Feeding Plan: breast   Breast pump: declines has 2 at home  Pediatrician: established   Contraception: likely plans Paraguard IUD  Ultrasounds: 25-normal fetal growth and anatomy, f/u 32 wk  growth         Anemia during pregnancy in third trimester    3/10/25 - hgb 10.5  Taking oral iron  Will repeat CBC in four weeks         Relevant Orders    CBC and differential     #2 - - Third trimester packet provided and reviewed:   - Birth room rules and acknowledgement, birth plan, authorization for release of protected health information for photographers and birth certificate/SS worksheet to be brought to hospital at time of delivery.   -Delivery Consent signed today 3/12/2025.       GUS Coronel  3/12/2025  3:00 PM

## 2025-03-11 LAB — TREPONEMA PALLIDUM IGG+IGM AB [PRESENCE] IN SERUM OR PLASMA BY IMMUNOASSAY: NORMAL

## 2025-03-12 ENCOUNTER — ROUTINE PRENATAL (OUTPATIENT)
Dept: OBGYN CLINIC | Facility: CLINIC | Age: 30
End: 2025-03-12

## 2025-03-12 VITALS — WEIGHT: 231.2 LBS | BODY MASS INDEX: 36.21 KG/M2 | DIASTOLIC BLOOD PRESSURE: 64 MMHG | SYSTOLIC BLOOD PRESSURE: 118 MMHG

## 2025-03-12 DIAGNOSIS — O34.219 HX SUCCESSFUL VBAC (VAGINAL BIRTH AFTER CESAREAN), CURRENTLY PREGNANT: ICD-10-CM

## 2025-03-12 DIAGNOSIS — Z3A.29 29 WEEKS GESTATION OF PREGNANCY: ICD-10-CM

## 2025-03-12 DIAGNOSIS — O34.219 HISTORY OF CESAREAN DELIVERY, CURRENTLY PREGNANT: Primary | ICD-10-CM

## 2025-03-12 DIAGNOSIS — O99.013 ANEMIA DURING PREGNANCY IN THIRD TRIMESTER: ICD-10-CM

## 2025-03-12 PROCEDURE — PNV

## 2025-03-26 DIAGNOSIS — Z00.6 ENCOUNTER FOR EXAMINATION FOR NORMAL COMPARISON OR CONTROL IN CLINICAL RESEARCH PROGRAM: ICD-10-CM

## 2025-03-28 NOTE — PATIENT INSTRUCTIONS
"Patient Education     Your baby's movement before birth   The Basics   Written by the doctors and editors at [facility]   When should I start feeling my baby move? -- It depends. Most people first feel their baby moving in the uterus between about 16 and 20 weeks of pregnancy. It might take longer to feel movement if this is your first pregnancy or if the placenta is in the front of your uterus.  What kinds of movements should I feel? -- When you first feel your baby move, it might feel like a gentle flutter in your belly. This is sometimes called \"quickening.\" As the baby grows, their movements will get stronger. You will probably feel them kicking, rolling, and stretching. Later in pregnancy, you might be able to see and feel the baby moving from the outside.  You might notice that your baby is more active at certain times of the day or night. Even before birth, babies have periods of being asleep and awake. When your baby is sleeping, you might notice that they do not move as much.  Should I keep track of my baby's movements? -- If your pregnancy is healthy, you probably do not need to count or record your baby's movements. Feeling regular movement is a good sign that the baby is doing well.  In some cases, your doctor or midwife might ask you to keep track of your baby's movements. If so, they will tell you how to do this and when to call them.  A change in your baby's movements does not always mean that there is a problem. But in some cases, it can be a sign that the baby is having trouble. If your doctor or midwife is concerned, they can do tests to check on the baby.  If I am asked to track movement, how should I do it? -- There are different ways of tracking your baby's movement. This is sometimes called \"kick counting.\"  Your doctor or midwife will tell you exactly what to track. For example, they might ask you to write down:   How long it takes to feel 10 kicks or movements   How many times your baby moves " in 1 hour  Many experts consider at least 10 movements in 2 hours to be a sign that the baby is doing well. But there is no specific cutoff for exactly how much movement is healthy or unhealthy. Some babies are more active than others, and some pregnant people feel movement more easily than others. The main goal of kick counting is to get to know your baby's normal patterns so you can tell if anything changes.  If you are doing kick counting:   Choose a time of day when your baby is usually active.   Find a quiet place where you will not be distracted.   Lie down on your side in a comfortable position.   Check the clock, or set a timer.   Each time you feel your baby move or kick, write down the time. Some people use a smartphone josé to keep track.   If your baby seems less active than usual, try moving around, eating a snack, and emptying your bladder. This can help wake the baby up if they are asleep.   Stop counting after you have felt 10 kicks, or after the length of time your doctor or midwife told you.  When should I call the doctor? -- Call your doctor or midwife for advice if:   You have concerns about your baby's movement.   Your baby is moving less than they normally do.   You notice a sudden change in the pattern of your baby's movements.   You have any other symptoms that worry you.  All topics are updated as new evidence becomes available and our peer review process is complete.  This topic retrieved from [employer] on: Feb 26, 2024.  Topic 000907 Version 1.0  Release: 32.2.4 - C32.56  © 2024 UpToDate, Inc. and/or its affiliates. All rights reserved.  Consumer Information Use and Disclaimer   Disclaimer: This generalized information is a limited summary of diagnosis, treatment, and/or medication information. It is not meant to be comprehensive and should be used as a tool to help the user understand and/or assess potential diagnostic and treatment options. It does NOT include all information about  conditions, treatments, medications, side effects, or risks that may apply to a specific patient. It is not intended to be medical advice or a substitute for the medical advice, diagnosis, or treatment of a health care provider based on the health care provider's examination and assessment of a patient's specific and unique circumstances. Patients must speak with a health care provider for complete information about their health, medical questions, and treatment options, including any risks or benefits regarding use of medications. This information does not endorse any treatments or medications as safe, effective, or approved for treating a specific patient. UpToDate, Inc. and its affiliates disclaim any warranty or liability relating to this information or the use thereof.The use of this information is governed by the Terms of Use, available at https://www.Xiaohongshu.com/en/know/clinical-effectiveness-terms. © UpToDate, Inc. and its affiliates and/or licensors. All rights reserved.  Copyright   ©  UpToDate, Inc. and/or its affiliates. All rights reserved.  Thank you for choosing us for your  care today.  If you have any questions about your ultrasound or care, please do not hesitate to contact us or your primary obstetrician.        Some general instructions for your pregnancy are:    Exercise: Aim for 150 minutes per week of regular exercise.  Walking is great!  Nutrition: Choose healthy sources of calcium, iron, and protein.  Avoid ultraprocessed foods and added sugar.  Learn about Preeclampsia: preeclampsia is a common, potentially serious high blood pressure complication in pregnancy.  A blood pressure of 140mmHg (systolic or top number) or 90mmHg (diastolic or bottom number) should be evaluated by your doctor.  Aspirin is sometimes prescribed in early pregnancy to prevent preeclampsia in women with risk factors - ask your obstetrician if you should be on this medication.  For more  resources, visit:  https://www.highriskpregnancyinfo.org/preeclampsia  If you smoke, please try to quit completely but also try to reduce your smoking by as much as possible (as soon as possible).  Do not vape.  Please also avoid cannabis products.  Other warning signs to watch out for in pregnancy or postpartum: chest pain, obstructed breathing or shortness of breath, seizures, thoughts of hurting yourself or your baby, bleeding, a painful or swollen leg, fever, or headache (see AWNN POST-BIRTH Warning Signs campaign).  If these happen call 911.  Itching is also not normal in pregnancy and if you experience this, especially over your hands and feet, potentially worse at night, notify your doctors.

## 2025-03-31 ENCOUNTER — ULTRASOUND (OUTPATIENT)
Facility: HOSPITAL | Age: 30
End: 2025-03-31
Payer: COMMERCIAL

## 2025-03-31 VITALS
SYSTOLIC BLOOD PRESSURE: 98 MMHG | WEIGHT: 238 LBS | DIASTOLIC BLOOD PRESSURE: 52 MMHG | HEART RATE: 85 BPM | BODY MASS INDEX: 37.35 KG/M2 | HEIGHT: 67 IN

## 2025-03-31 DIAGNOSIS — O99.213 MATERNAL OBESITY, ANTEPARTUM, THIRD TRIMESTER: Primary | ICD-10-CM

## 2025-03-31 DIAGNOSIS — Z3A.32 32 WEEKS GESTATION OF PREGNANCY: ICD-10-CM

## 2025-03-31 DIAGNOSIS — E66.811 CLASS 1 OBESITY: ICD-10-CM

## 2025-03-31 DIAGNOSIS — O09.893 SHORT INTERVAL BETWEEN PREGNANCIES COMPLICATING PREGNANCY, ANTEPARTUM, THIRD TRIMESTER: ICD-10-CM

## 2025-03-31 PROCEDURE — 76816 OB US FOLLOW-UP PER FETUS: CPT | Performed by: OBSTETRICS & GYNECOLOGY

## 2025-03-31 PROCEDURE — 99213 OFFICE O/P EST LOW 20 MIN: CPT | Performed by: OBSTETRICS & GYNECOLOGY

## 2025-03-31 NOTE — LETTER
March 31, 2025     Vivian Tobias MD  [address] [facility] [address]    Patient: Ida Browning   YOB: 1995   Date of Visit: 3/31/2025       Dear Dr. Tobias:    Thank you for referring Ida Browning to me for evaluation. Below are my notes for this consultation.    If you have questions, please do not hesitate to call me. I look forward to following your patient along with you.         Sincerely,        Sd Mayfield MD        CC: No Recipients    Sd Mayfield MD  3/30/2025  2:15 PM  Sign when Signing Visit  Please refer to the [facility] ultrasound report in Ob Procedures for additional information regarding today's visit

## 2025-04-16 ENCOUNTER — APPOINTMENT (OUTPATIENT)
Dept: LAB | Facility: CLINIC | Age: 30
End: 2025-04-16

## 2025-04-16 DIAGNOSIS — Z00.6 ENCOUNTER FOR EXAMINATION FOR NORMAL COMPARISON OR CONTROL IN CLINICAL RESEARCH PROGRAM: ICD-10-CM

## 2025-04-16 PROCEDURE — 36415 COLL VENOUS BLD VENIPUNCTURE: CPT

## 2025-04-25 ENCOUNTER — ROUTINE PRENATAL (OUTPATIENT)
Dept: OBGYN CLINIC | Facility: CLINIC | Age: 30
End: 2025-04-25

## 2025-04-25 VITALS — WEIGHT: 243 LBS | SYSTOLIC BLOOD PRESSURE: 116 MMHG | BODY MASS INDEX: 38.06 KG/M2 | DIASTOLIC BLOOD PRESSURE: 72 MMHG

## 2025-04-25 DIAGNOSIS — O99.013 ANEMIA DURING PREGNANCY IN THIRD TRIMESTER: ICD-10-CM

## 2025-04-25 DIAGNOSIS — O34.219 HX SUCCESSFUL VBAC (VAGINAL BIRTH AFTER CESAREAN), CURRENTLY PREGNANT: Primary | ICD-10-CM

## 2025-04-25 PROCEDURE — PNV: Performed by: STUDENT IN AN ORGANIZED HEALTH CARE EDUCATION/TRAINING PROGRAM

## 2025-04-25 NOTE — PROGRESS NOTES
OB/GYN  PN Visit  Ida Browning  [id number]  2025  10:17 AM  Vivian Tobias MD    S: 30 y.o.  35w5d here for PN visit- she notes Clemons hassan but denies any regular CTX, LOF or VB. She endorses good FM.     Pregnancy complicated by prior  delivery followed by 2 successful  and anemia.       O:  Vitals:    25 1000   BP: 116/72       A/P:  #1. 35w5d GESTATION  Labs UTD,HGB 10.5  Pap smear: 24 WNL neg HPV      GC/CT: refused  Prenatal Panel: done  Blood Type: A+ RhoGam not indicated   GBS: plan next visit  Genetic Testing: declines   Vaccines:  Tdap: declined 3/12/2025  Flu: declines   Birth Plan:  +/- epi  Yellow packet to be given and consents signed 3/12/25  Feeding Plan: breast   Breast pump: declines has 2 at home  Pediatrician: established   Contraception: likely plans Paraguard IUD  Ultrasounds: 25-normal fetal growth and anatomy, f/u 32 wk growth    #2. Anemia- last Hg 10.5 on 3/10- reviewed recommended repeat    Vivian Tobias MD  2025  10:17 AM

## 2025-04-28 LAB
APOB+LDLR+PCSK9 GENE MUT ANL BLD/T: NOT DETECTED
BRCA1+BRCA2 DEL+DUP + FULL MUT ANL BLD/T: NOT DETECTED
MLH1+MSH2+MSH6+PMS2 GN DEL+DUP+FUL M: NOT DETECTED

## 2025-05-05 ENCOUNTER — ROUTINE PRENATAL (OUTPATIENT)
Dept: OBGYN CLINIC | Facility: CLINIC | Age: 30
End: 2025-05-05

## 2025-05-05 ENCOUNTER — TELEPHONE (OUTPATIENT)
Dept: OBGYN CLINIC | Facility: CLINIC | Age: 30
End: 2025-05-05

## 2025-05-05 VITALS — WEIGHT: 249.2 LBS | BODY MASS INDEX: 39.03 KG/M2 | SYSTOLIC BLOOD PRESSURE: 124 MMHG | DIASTOLIC BLOOD PRESSURE: 80 MMHG

## 2025-05-05 DIAGNOSIS — O99.210 OBESITY IN PREGNANCY: ICD-10-CM

## 2025-05-05 DIAGNOSIS — Z3A.37 37 WEEKS GESTATION OF PREGNANCY: Primary | ICD-10-CM

## 2025-05-05 DIAGNOSIS — O34.219 HX SUCCESSFUL VBAC (VAGINAL BIRTH AFTER CESAREAN), CURRENTLY PREGNANT: ICD-10-CM

## 2025-05-05 DIAGNOSIS — O34.219 HISTORY OF CESAREAN DELIVERY, CURRENTLY PREGNANT: ICD-10-CM

## 2025-05-05 DIAGNOSIS — O99.013 ANEMIA DURING PREGNANCY IN THIRD TRIMESTER: ICD-10-CM

## 2025-05-05 DIAGNOSIS — Z92.89 HISTORY OF BLOOD TRANSFUSION: ICD-10-CM

## 2025-05-05 PROCEDURE — PNV: Performed by: OBSTETRICS & GYNECOLOGY

## 2025-05-05 PROCEDURE — 87150 DNA/RNA AMPLIFIED PROBE: CPT | Performed by: OBSTETRICS & GYNECOLOGY

## 2025-05-05 NOTE — ASSESSMENT & PLAN NOTE
- Continue PNV  - Labor precautions reviewed  - Fetal kick counts reviewed  - Labs: Up to date and WNL. Outstanding CBC ordered 3/12 to assess anemia. Prior Hgb 3/10: 10.5.  - Genetics: Helix Molecular Screen - no abnormalities detected.   - Ultrasounds: 3/31 WNL for growth and anatomy.  - Tdap: Declined 3/12.   - Flu Shot: Declined 3/12. Will offer in season.   - RSV:  Will offer during season (9/1-1/31) @ [id number]   - COVID: Vaccinated.   - Rhogam: N/A  - GBS: Collected today  - Delivery: TOLAC with epidural.   - Contraception: Likely plans for Paraguard IUD.   - Infant feeding: Breast feeding; Declined breast pump, has 2 at home.   - Pediatrician: Established.   - Labor talk done: Staff message sent for scheduling.   - RTO in 1 week for 38-week visit with Dr. Mendoza.    Orders:    Strep B DNA probe, amplification

## 2025-05-05 NOTE — ASSESSMENT & PLAN NOTE
Lab Results   Component Value Date/Time    HGB 10.5 (L) 03/10/2025 01:30 PM   Continue iron supplementation

## 2025-05-05 NOTE — ASSESSMENT & PLAN NOTE
Pregnant women with a BMI>30 ideally should aim for maximum weight gain of 11-20 pounds ideally via healthy diet and regular exercise.   Maternal BMI above 30 in pregnancy confers increased risks of preeclampsia, GDM, cardiac dysfunction, sleep apnea,  delivery, and VTE, and fetal risks include miscarriage, fetal anomalies (along with reduced detection), and stillbirth, macrosomia and impaired growth.   Growth assessment is advised in the third trimester.   For women with prepregnancy BMI greater than 35, we recommend weekly antepartum fetal surveillance beginning at 37 weeks to continue until delivery.    TWG expected: 5 kg (11 lb)-9 kg (19 lb)  TW.8 kg (50 lb 3.2 oz)

## 2025-05-05 NOTE — ASSESSMENT & PLAN NOTE
Pt had blood transfusion with delivery in 2019 & 2021. Never had heme work-up.  Recommend T&C x2 U on admission and plan for hemorrhage kit in room with aggressive use of tocolytics  No PPH with delivery in 2023

## 2025-05-05 NOTE — ASSESSMENT & PLAN NOTE
CS in  for failure to progress in first stage followed by  in  complicated by 3rd degree laceration  Patient requests TOLAC

## 2025-05-05 NOTE — ASSESSMENT & PLAN NOTE
Hx  x2  Most recent delivery 2023  Patient hoping for  again  Patient requests IOL at 40 weeks

## 2025-05-05 NOTE — PROGRESS NOTES
OB/GYN  PN Visit  Ida Browning  [id number]  2025  1:59 PM  Dr. Joanne Rivas MD    S: 30 y.o.  37w1d here for PN visit. She denies contractions. She denies leakage of fluid and vaginal bleeding. She notes good fetal movement. She notes mild edema, but denies nausea, vomiting, headache, cramping, domestic violence, and smoking. Her pregnancy is complicated by hx C/S ( x2), history of PPH w/ blood transfusion, BMI >35, and anemia.     Patient notes that her dad, who lives in [place], was recently diagnosed with stage 4 cancer. As a result, pt is requesting an IOL immediately before her due date- ideally the night of  or the morning of .      O:  Pre-Tasneem Vitals      Flowsheet Row Most Recent Value   Prenatal Assessment    Fetal Heart Rate 137   Fundal Height (cm) 39 cm   Movement Present   Presentation Vertex   Prenatal Vitals    Blood Pressure 124/80   Weight - Scale 113 kg (249 lb 3.2 oz)   Urine Albumin/Glucose    Dilation/Effacement/Station    Cervical Dilation 1   Cervical Effacement 60   Fetal Station -3   Vaginal Drainage    Draining Fluid No   Edema    LLE Edema None   RLE Edema None          Physical Exam  Vitals reviewed.   Constitutional:       General: She is not in acute distress.     Appearance: Normal appearance. She is well-developed. She is not ill-appearing, toxic-appearing or diaphoretic.   Cardiovascular:      Rate and Rhythm: Normal rate.   Pulmonary:      Effort: Pulmonary effort is normal. No respiratory distress.   Abdominal:      General: There is no distension.      Palpations: Abdomen is soft. There is no mass.      Tenderness: There is no abdominal tenderness. There is no guarding or rebound.   Genitourinary:     General: Normal vulva.      Exam position: Lithotomy position.      Labia:         Right: No rash, tenderness, lesion or injury.         Left: No rash, tenderness, lesion or injury.       Comments: Gravid, nontender  Skin:      General: Skin is warm and dry.   Neurological:      Mental Status: She is alert and oriented to person, place, and time.   Psychiatric:         Mood and Affect: Mood normal.         Behavior: Behavior normal.       Assessment & Plan  37 weeks gestation of pregnancy  - Continue PNV  - Labor precautions reviewed  - Fetal kick counts reviewed  - Labs: Up to date and WNL. Outstanding CBC ordered 3/12 to assess anemia. Prior Hgb 3/10: 10.5.  - Genetics: Helix Molecular Screen - no abnormalities detected.   - Ultrasounds: 3/31 WNL for growth and anatomy.  - Tdap: Declined 3/12.   - Flu Shot: Declined 3/12. Will offer in season.   - RSV:  Will offer during season (-) @ [id number]   - COVID: Vaccinated.   - Rhogam: N/A  - GBS: Collected today  - Delivery: TOLAC with epidural.   - Contraception: Likely plans for Paraguard IUD.   - Infant feeding: Breast feeding; Declined breast pump, has 2 at home.   - Pediatrician: Established.   - Labor talk done: Staff message sent for scheduling.   - RTO in 1 week for 38-week visit with Dr. Mendoza.    Orders:    Strep B DNA probe, amplification    Hx successful  (vaginal birth after ), currently pregnant  Hx  x2  Most recent delivery 2023  Patient hoping for  again  Patient requests IOL at 40 weeks       History of  delivery, currently pregnant  CS in 2019 for failure to progress in first stage followed by  in  complicated by 3rd degree laceration  Patient requests TOLAC       History of blood transfusion  Pt had blood transfusion with delivery in 2019 & . Never had heme work-up.  Recommend T&C x2 U on admission and plan for hemorrhage kit in room with aggressive use of tocolytics  No PPH with delivery in        Obesity in pregnancy  Pregnant women with a BMI>30 ideally should aim for maximum weight gain of 11-20 pounds ideally via healthy diet and regular exercise.   Maternal BMI above 30 in pregnancy confers increased risks of  preeclampsia, GDM, cardiac dysfunction, sleep apnea,  delivery, and VTE, and fetal risks include miscarriage, fetal anomalies (along with reduced detection), and stillbirth, macrosomia and impaired growth.   Growth assessment is advised in the third trimester.   For women with prepregnancy BMI greater than 35, we recommend weekly antepartum fetal surveillance beginning at 37 weeks to continue until delivery.    TWG expected: 5 kg (11 lb)-9 kg (19 lb)  TW.8 kg (50 lb 3.2 oz)         Anemia during pregnancy in third trimester  Lab Results   Component Value Date/Time    HGB 10.5 (L) 03/10/2025 01:30 PM   Continue iron supplementation             Future Appointments   Date Time Provider Department Center   5/15/2025  9:45 AM Yris Mendoza MD [facility]   2025  9:45 AM Ashish Ramirez PA-C [facility]   2025  9:30 AM GUS Coronel [facility]   2025 10:00 AM Vivian Tobias MD [facility]         Joanne Rivas MD  2025  1:59 PM

## 2025-05-07 LAB — GP B STREP DNA SPEC QL NAA+PROBE: NEGATIVE

## 2025-05-08 ENCOUNTER — RESULTS FOLLOW-UP (OUTPATIENT)
Dept: OBGYN CLINIC | Facility: CLINIC | Age: 30
End: 2025-05-08

## 2025-05-23 ENCOUNTER — TELEPHONE (OUTPATIENT)
Dept: OBGYN CLINIC | Facility: CLINIC | Age: 30
End: 2025-05-23

## 2025-05-23 NOTE — TELEPHONE ENCOUNTER
----- Message from Joanne Rivas MD sent at 2025  1:53 PM EDT -----  Regarding: Schedule eIOL  Please schedule IOL - elective (Patient's dad has stage 4 cancer in [place] so she would like to schedule IOL to be available for travel if necessary).   Patient requests schedulin/23 or    Patient is a  at 37w1d  Estimated Date of Delivery: 25  Cervical exam: /-3  Ripening: Preferably but ok without (3rd baby)  PM slot: Yes  AM slot: Yes  Preferences:  or  (prior to due date but not too early)    Thanks!  
2nd attempt - left message patient's VM.  
Cinpost message sent to patient re: induction.  
ESC message sent to Jana GUZMAN @ [facility] L&D to check availability for EIOL 5/23 8pm ripening into 5/24 or 5/24 8pm ripening into 5/25 (confirmed ok with Dr West since weekend & she is on call provider) or as 1 Day 5/24 or 5/25 if no availability for pm ripening - awaiting response.  
ESC message sent to Jana GUZMAN @ [facility] re: scheduling patient for EIOL on 5/23/2025 3 pm ripening (Ok per Dr Mendoza)  
Left message patient's VM re: induction availability - there is availability 5/22/2025 8pm ripening into 5/23 or 5/23 3pm or 8 pm ripening into 5/24 but would need to check with Dr West (on call Sat 5/24/2025)  
Patient is currently out of town in [place] due to change with family member's health.  Uncertain when she will be returning to this area.  She will recall next wk to update.  Not scheduling EIOL at this time.  Next OB appointment is 5/15/2025.  
Patient's next OB appointment is 5/30/2025.  Patient has not been seen since 5/5/2025 as she may still be currently out of town (see previous notes).  Left message patient's VM to recall office as to if she is still out of town & until when.  
Pt. Is a 24year old male with no prior psych hospitalization. Pt was admitted to this facility for having ideations to harm self  After drinking alcohol. SW Contact:  SW met with pt to discuss d/c planning. Pt. Expressed he has been feeling down since his girlfriend of 6 years left him along with her 1year old son. Pt stated he and girlfriend has two children. They have a 1year old ( girlfriend's child ) and 2 month old child together. Pt. Stated the girlfriend took off with his car and 1year old. The pt stated he is not sure where the girlfriend is. The pt. Stated the girlfriend has blocked all calls from him , his family and her family. Pt. Stated he is concerned for her and her 1year old son. Pt. Stated his girlfriend does suffer from Bipolar disorder. Pt. Stated she has been experiencing some postpartum. Pt. Stated when she gets mad at him. , she will tell him come and get your daughter. Pt. stated the 4 of them live with his brother. The pt. Stated their 3 moth old daughter is currently in the care of his grandmother. Pt. Stated he got drunk and started having thoughts to harm self. Pt. Also admits to increased drinking of alcohol over the last 6 months. Pt stated he has in the past drank but did not have thoughts to harm self. Pt expressed he is worried. SW discussed positive coping skills, relapse prevention and safety plan. SW also discussed conflict resolution, self-efficacy and benefits of therapy. Pt. Was cooperative, anxious and denies ideations and hallucinations.
Request for IOL resent to L&D via ESC. 5/23, at 3 pm. Will check with on call provider, Dr. Mendoza.   
Will need to recall L&D when patient is 38 wk (38 wk 1 day on 5/12/2025) to schedule for requested dates. Left message patient's VM to inform.  
Mother/Patient

## 2025-05-30 ENCOUNTER — TELEPHONE (OUTPATIENT)
Age: 30
End: 2025-05-30

## 2025-05-30 NOTE — TELEPHONE ENCOUNTER
Initial IOL request was sent on 5/5/25. She was initially offered eIOL 5/22 into 5/23 but had to leave town for her dad in [place]. Patient called today requesting IOL. Availability reviewed with L&D and patient was scheduled for 6/3/25 at 3pm. Staff message sent to Dr. Tobias.     (She has not been seen since her appt with me at 37wks. This is her 4th baby but had a 1LTCS with her first followed by 2 VBACs. Most recent was in 2023 with me.)    Joanne Rivas MD  OB/GYN  She/her  5/30/2025  5:36 PM

## 2025-05-30 NOTE — TELEPHONE ENCOUNTER
PT called in regarding being scheduled for her induction that is supposed to be on Monday. Contacted the office and they will have someone reach out to her since Ana is out.

## 2025-06-02 RX ORDER — BUPIVACAINE HYDROCHLORIDE 2.5 MG/ML
30 INJECTION, SOLUTION EPIDURAL; INFILTRATION; INTRACAUDAL; PERINEURAL ONCE AS NEEDED
Status: CANCELLED | OUTPATIENT
Start: 2025-06-02

## 2025-06-02 RX ORDER — ONDANSETRON 2 MG/ML
4 INJECTION INTRAMUSCULAR; INTRAVENOUS EVERY 6 HOURS PRN
Status: CANCELLED | OUTPATIENT
Start: 2025-06-02

## 2025-06-02 RX ORDER — SODIUM CHLORIDE, SODIUM LACTATE, POTASSIUM CHLORIDE, CALCIUM CHLORIDE 600; 310; 30; 20 MG/100ML; MG/100ML; MG/100ML; MG/100ML
125 INJECTION, SOLUTION INTRAVENOUS CONTINUOUS
Status: CANCELLED | OUTPATIENT
Start: 2025-06-02

## 2025-06-03 ENCOUNTER — HOSPITAL ENCOUNTER (INPATIENT)
Facility: HOSPITAL | Age: 30
LOS: 1 days | Discharge: HOME | End: 2025-06-04
Attending: OBSTETRICS & GYNECOLOGY | Admitting: STUDENT IN AN ORGANIZED HEALTH CARE EDUCATION/TRAINING PROGRAM
Payer: COMMERCIAL

## 2025-06-03 ENCOUNTER — ANESTHESIA EVENT (INPATIENT)
Dept: ANESTHESIOLOGY | Facility: HOSPITAL | Age: 30
End: 2025-06-03
Payer: COMMERCIAL

## 2025-06-03 ENCOUNTER — ANESTHESIA (INPATIENT)
Dept: ANESTHESIOLOGY | Facility: HOSPITAL | Age: 30
End: 2025-06-03
Payer: COMMERCIAL

## 2025-06-03 ENCOUNTER — HOSPITAL ENCOUNTER (OUTPATIENT)
Dept: LABOR AND DELIVERY | Facility: HOSPITAL | Age: 30
Discharge: HOME | End: 2025-06-03
Payer: COMMERCIAL

## 2025-06-03 DIAGNOSIS — O48.0 41 WEEKS GESTATION OF PREGNANCY: Primary | ICD-10-CM

## 2025-06-03 DIAGNOSIS — O34.219 VBAC (VAGINAL BIRTH AFTER CESAREAN): Primary | ICD-10-CM

## 2025-06-03 DIAGNOSIS — Z3A.41 41 WEEKS GESTATION OF PREGNANCY: Primary | ICD-10-CM

## 2025-06-03 DIAGNOSIS — O48.0 41 WEEKS GESTATION OF PREGNANCY: ICD-10-CM

## 2025-06-03 DIAGNOSIS — Z3A.41 41 WEEKS GESTATION OF PREGNANCY: ICD-10-CM

## 2025-06-03 LAB
ABO GROUP BLD: NORMAL
BASE EXCESS BLDCOA CALC-SCNC: -3.2 MMOL/L (ref 3–11)
BASE EXCESS BLDCOV CALC-SCNC: -2.7 MMOL/L (ref 1–9)
BLD GP AB SCN SERPL QL: NEGATIVE
ERYTHROCYTE [DISTWIDTH] IN BLOOD BY AUTOMATED COUNT: 14.6 % (ref 11.6–15.1)
HCO3 BLDCOA-SCNC: 25.8 MMOL/L (ref 17.3–27.3)
HCO3 BLDCOV-SCNC: 21.5 MMOL/L (ref 12.2–28.6)
HCT VFR BLD AUTO: 36.1 % (ref 34.8–46.1)
HGB BLD-MCNC: 11.8 G/DL (ref 11.5–15.4)
HOLD SPECIMEN: NORMAL
MCH RBC QN AUTO: 29.8 PG (ref 26.8–34.3)
MCHC RBC AUTO-ENTMCNC: 32.7 G/DL (ref 31.4–37.4)
MCV RBC AUTO: 91 FL (ref 82–98)
O2 CT VFR BLDCOA CALC: 6.8 ML/DL
OXYHGB MFR BLDCOA: 27.3 %
OXYHGB MFR BLDCOV: 66.3 %
PCO2 BLDCOA: 61.7 MM[HG] (ref 30–60)
PCO2 BLDCOV: 36.5 MM HG (ref 27–43)
PH BLDCOA: 7.24 [PH] (ref 7.23–7.43)
PH BLDCOV: 7.39 [PH] (ref 7.19–7.49)
PLATELET # BLD AUTO: 162 THOUSANDS/UL (ref 149–390)
PMV BLD AUTO: 11.2 FL (ref 8.9–12.7)
PO2 BLDCOA: 17.3 MM HG (ref 5–25)
PO2 BLDCOV: 27.9 MM HG (ref 15–45)
RBC # BLD AUTO: 3.96 MILLION/UL (ref 3.81–5.12)
RH BLD: POSITIVE
SAO2 % BLDCOV: 16.2 ML/DL
SPECIMEN EXPIRATION DATE: NORMAL
TREPONEMA PALLIDUM IGG+IGM AB [PRESENCE] IN SERUM OR PLASMA BY IMMUNOASSAY: NORMAL
WBC # BLD AUTO: 15.75 THOUSAND/UL (ref 4.31–10.16)

## 2025-06-03 PROCEDURE — 82805 BLOOD GASES W/O2 SATURATION: CPT | Performed by: OBSTETRICS & GYNECOLOGY

## 2025-06-03 PROCEDURE — NC001 PR NO CHARGE: Performed by: STUDENT IN AN ORGANIZED HEALTH CARE EDUCATION/TRAINING PROGRAM

## 2025-06-03 PROCEDURE — 85027 COMPLETE CBC AUTOMATED: CPT

## 2025-06-03 PROCEDURE — 86900 BLOOD TYPING SEROLOGIC ABO: CPT

## 2025-06-03 PROCEDURE — 86901 BLOOD TYPING SEROLOGIC RH(D): CPT

## 2025-06-03 PROCEDURE — 87591 N.GONORRHOEAE DNA AMP PROB: CPT

## 2025-06-03 PROCEDURE — 87491 CHLMYD TRACH DNA AMP PROBE: CPT

## 2025-06-03 PROCEDURE — 86780 TREPONEMA PALLIDUM: CPT

## 2025-06-03 PROCEDURE — 99213 OFFICE O/P EST LOW 20 MIN: CPT

## 2025-06-03 PROCEDURE — 0KQM0ZZ REPAIR PERINEUM MUSCLE, OPEN APPROACH: ICD-10-PCS | Performed by: STUDENT IN AN ORGANIZED HEALTH CARE EDUCATION/TRAINING PROGRAM

## 2025-06-03 PROCEDURE — G0463 HOSPITAL OUTPT CLINIC VISIT: HCPCS

## 2025-06-03 PROCEDURE — 4A1HXCZ MONITORING OF PRODUCTS OF CONCEPTION, CARDIAC RATE, EXTERNAL APPROACH: ICD-10-PCS | Performed by: STUDENT IN AN ORGANIZED HEALTH CARE EDUCATION/TRAINING PROGRAM

## 2025-06-03 PROCEDURE — 86850 RBC ANTIBODY SCREEN: CPT

## 2025-06-03 RX ORDER — CALCIUM CARBONATE 500 MG/1
500 TABLET, CHEWABLE ORAL DAILY PRN
Status: DISCONTINUED | OUTPATIENT
Start: 2025-06-03 | End: 2025-06-03

## 2025-06-03 RX ORDER — SIMETHICONE 80 MG
80 TABLET,CHEWABLE ORAL 4 TIMES DAILY PRN
Status: DISCONTINUED | OUTPATIENT
Start: 2025-06-03 | End: 2025-06-05 | Stop reason: HOSPADM

## 2025-06-03 RX ORDER — DIPHENHYDRAMINE HCL 25 MG
25 TABLET ORAL EVERY 6 HOURS PRN
Status: DISCONTINUED | OUTPATIENT
Start: 2025-06-03 | End: 2025-06-05 | Stop reason: HOSPADM

## 2025-06-03 RX ORDER — SODIUM CHLORIDE, SODIUM LACTATE, POTASSIUM CHLORIDE, CALCIUM CHLORIDE 600; 310; 30; 20 MG/100ML; MG/100ML; MG/100ML; MG/100ML
125 INJECTION, SOLUTION INTRAVENOUS CONTINUOUS
Status: DISCONTINUED | OUTPATIENT
Start: 2025-06-03 | End: 2025-06-03

## 2025-06-03 RX ORDER — OXYTOCIN/0.9 % SODIUM CHLORIDE 30/500 ML
250 PLASTIC BAG, INJECTION (ML) INTRAVENOUS ONCE
Status: DISCONTINUED | OUTPATIENT
Start: 2025-06-03 | End: 2025-06-05 | Stop reason: HOSPADM

## 2025-06-03 RX ORDER — LIDOCAINE HYDROCHLORIDE AND EPINEPHRINE 15; 5 MG/ML; UG/ML
INJECTION, SOLUTION EPIDURAL
Status: COMPLETED | OUTPATIENT
Start: 2025-06-03 | End: 2025-06-03

## 2025-06-03 RX ORDER — BUPIVACAINE HYDROCHLORIDE 2.5 MG/ML
30 INJECTION, SOLUTION EPIDURAL; INFILTRATION; INTRACAUDAL; PERINEURAL ONCE AS NEEDED
Status: DISCONTINUED | OUTPATIENT
Start: 2025-06-03 | End: 2025-06-03

## 2025-06-03 RX ORDER — ONDANSETRON 2 MG/ML
4 INJECTION INTRAMUSCULAR; INTRAVENOUS EVERY 8 HOURS PRN
Status: DISCONTINUED | OUTPATIENT
Start: 2025-06-03 | End: 2025-06-05 | Stop reason: HOSPADM

## 2025-06-03 RX ORDER — CALCIUM CARBONATE 500 MG/1
1000 TABLET, CHEWABLE ORAL DAILY PRN
Status: DISCONTINUED | OUTPATIENT
Start: 2025-06-03 | End: 2025-06-05 | Stop reason: HOSPADM

## 2025-06-03 RX ORDER — IBUPROFEN 600 MG/1
600 TABLET, FILM COATED ORAL EVERY 6 HOURS
Status: DISCONTINUED | OUTPATIENT
Start: 2025-06-03 | End: 2025-06-05 | Stop reason: HOSPADM

## 2025-06-03 RX ORDER — ACETAMINOPHEN 325 MG/1
650 TABLET ORAL EVERY 4 HOURS PRN
Status: DISCONTINUED | OUTPATIENT
Start: 2025-06-03 | End: 2025-06-05 | Stop reason: HOSPADM

## 2025-06-03 RX ORDER — ACETAMINOPHEN 325 MG/1
975 TABLET ORAL EVERY 6 HOURS PRN
Status: DISCONTINUED | OUTPATIENT
Start: 2025-06-03 | End: 2025-06-03

## 2025-06-03 RX ORDER — BENZOCAINE/MENTHOL 6 MG-10 MG
1 LOZENGE MUCOUS MEMBRANE DAILY PRN
Status: DISCONTINUED | OUTPATIENT
Start: 2025-06-03 | End: 2025-06-05 | Stop reason: HOSPADM

## 2025-06-03 RX ORDER — DOCUSATE SODIUM 100 MG/1
100 CAPSULE, LIQUID FILLED ORAL 2 TIMES DAILY
Status: DISCONTINUED | OUTPATIENT
Start: 2025-06-03 | End: 2025-06-05 | Stop reason: HOSPADM

## 2025-06-03 RX ORDER — ONDANSETRON 2 MG/ML
4 INJECTION INTRAMUSCULAR; INTRAVENOUS EVERY 6 HOURS PRN
Status: DISCONTINUED | OUTPATIENT
Start: 2025-06-03 | End: 2025-06-03

## 2025-06-03 RX ORDER — OXYTOCIN/0.9 % SODIUM CHLORIDE 30/500 ML
1-30 PLASTIC BAG, INJECTION (ML) INTRAVENOUS
Status: DISCONTINUED | OUTPATIENT
Start: 2025-06-03 | End: 2025-06-03

## 2025-06-03 RX ORDER — OXYTOCIN/0.9 % SODIUM CHLORIDE 30/500 ML
PLASTIC BAG, INJECTION (ML) INTRAVENOUS
Status: DISPENSED
Start: 2025-06-03 | End: 2025-06-03

## 2025-06-03 RX ADMIN — SODIUM CHLORIDE, SODIUM LACTATE, POTASSIUM CHLORIDE, AND CALCIUM CHLORIDE 125 ML/HR: .6; .31; .03; .02 INJECTION, SOLUTION INTRAVENOUS at 09:19

## 2025-06-03 RX ADMIN — WITCH HAZEL 1 PAD: 500 SOLUTION RECTAL; TOPICAL at 17:19

## 2025-06-03 RX ADMIN — SODIUM CHLORIDE, SODIUM LACTATE, POTASSIUM CHLORIDE, AND CALCIUM CHLORIDE 999.9 ML/HR: .6; .31; .03; .02 INJECTION, SOLUTION INTRAVENOUS at 08:16

## 2025-06-03 RX ADMIN — IBUPROFEN 600 MG: 600 TABLET, FILM COATED ORAL at 19:54

## 2025-06-03 RX ADMIN — DOCUSATE SODIUM 100 MG: 100 CAPSULE, LIQUID FILLED ORAL at 18:18

## 2025-06-03 RX ADMIN — Medication 2 MILLI-UNITS/MIN: at 11:09

## 2025-06-03 RX ADMIN — BENZOCAINE AND LEVOMENTHOL 1 APPLICATION: 200; 5 SPRAY TOPICAL at 17:19

## 2025-06-03 RX ADMIN — ROPIVACAINE HYDROCHLORIDE: 2 INJECTION, SOLUTION EPIDURAL; INFILTRATION at 09:22

## 2025-06-03 RX ADMIN — SODIUM CHLORIDE, SODIUM LACTATE, POTASSIUM CHLORIDE, AND CALCIUM CHLORIDE 125 ML/HR: .6; .31; .03; .02 INJECTION, SOLUTION INTRAVENOUS at 13:34

## 2025-06-03 RX ADMIN — LIDOCAINE HYDROCHLORIDE AND EPINEPHRINE 3 ML: 15; 5 INJECTION, SOLUTION EPIDURAL at 09:09

## 2025-06-03 NOTE — L&D DELIVERY NOTE
DELIVERY NOTE  Ida Browning 30 y.o. female MRN: [id number]  Unit/Bed#: -01 Encounter: [id number]    Obstetrician:    Dr. Vivian Tobias MD    Assistant:   Dr. Covarrubias    Pre-Delivery Diagnosis:   Problem List[1]    Post-Delivery Diagnosis:   Same as above - Delivered  Viable male fetus  2nd degree laceration    Procedure:  Spontaneous vaginal delivery  Repair 2nd degree spontaneous laceration    Findings:  1. Viable male  delivered on 6/3/2025 at 1348 with weight pending at time of dictation,  Apgar scores of 8 at one minute and 8 at five minutes.   2. Spontaneous delivery of placenta with centrally inserted 3-vessel cord  3. Clear amniotic fluid  4. 2nd degree perineal laceration, repaired with 3-0 Vicryl rapide    Specimens:  Cord blood obtained  Placenta; normal appearing, central insertion, intact  Arterial and venous blood gases (below)     Gases:  Umbilical Cord Venous Blood Gas:  Results from last 7 days   Lab Units 25  1357   PH COV  7.389   PCO2 COV mm HG 36.5   HCO3 COV mmol/L 21.5   BASE EXC COV mmol/L -2.7*   O2 CT CD VB mL/dL 16.2   O2 HGB, VENOUS CORD % 66.3     Umbilical Cord Arterial Blood Gas:  Results from last 7 days   Lab Units 25  1357   PH COA  7.240   PCO2 COA  61.7*   PO2 COA mm HG 17.3   HCO3 COA mmol/L 25.8   BASE EXC COA mmol/L -3.2*   O2 CONTENT CORD ART ml/dl 6.8   O2 HGB, ARTERIAL CORD % 27.3       Quantitative Blood Loss:   392 mL           Complications:    None       Brief labor course:   Ida Browning is a 30 y.o.  at 41w2d. She presented to labor and delivery for contractions. Her pregnancy was complicated by hx of  section, hx of 2 prior VBACs, anemia, and BMI 39. On exam in triage she was noted to be 5/70/-2. She was admitted for labor. Ida received an epidural for pain management. She continued to make change with an intermittently category II FHT to completely dilated.      Procedure Details      Description of  procedure     After pushing for 7 minutes, on 6/3/2025 at 1348 patient delivered a viable male , Apgars of 8 (1 min) and 8 (5 min). The fetal vertex delivered spontaneously. There was no nuchal cord. With the assistance of maternal expulsive efforts and gentle downward traction of the fetal head, the anterior (right) shoulder was delivered without difficulty, followed by the remainder of the infant's body and contralateral arm.     After delivery of the , delayed clamping of the umbilical cord was undertaken for 30 seconds. The  was noted to have good tone and cry spontaneously. There was no apparent injury to the . The cord was then doubly clamped and cut and the  was passed off to  staff for routine care. Umbilical cord blood and umbilical artery and venous gases were collected. Placenta was delivered with fundal massage and gentle traction on the cord with active management of the third stage of labor. Placenta delivered intact with a 3-vessel cord. Active management of the third stage of labor was undertaken with IV pitocin at 250milliunits/min. A bimanual exam was performed and small clots were removed. Bleeding was noted to be under control.     Outcome:  Living  with APGARS 8, 8 at 1 and 5 minutes, respectively.     weight pending    Perineal Inspection/Laceration Repair    The vagina, cervix, perineum, and rectum were inspected and there was noted to be a 2nd degree laceration. Under adequate anesthesia, the apex the vaginal laceration was identified and an anchoring suture was placed 1 cm above the apex.  The vaginal mucosa and underlying rectovaginal fascia were closed using a running locked 3.0 Vicryl-CT 1 suture to the hymenal ring.  The suture was then brought underneath the hymenal ring.  A stitch was then placed through the bulbocavernosus muscle. Continuing with the same suture, the transverse perineal muscles were reapproximated.  The  suture was brought to the posterior apex of the skin laceration and then the skin was reapproximated in a subcuticular fashion to the hymenal ring. Excellent hemostasis was achieved.     Conclusion:  Mother and baby are currently recovering nicely in stable condition.    Attending Supervision:   Dr. Vivian Tobias MD was present for the entire procedure.    Nemo Covarrubias MD  OB/GYN PGY-4  6/3/2025  2:13 PM         [1]   Patient Active Problem List  Diagnosis    Obesity in pregnancy    History of  delivery, currently pregnant    History of blood transfusion    Hx successful  (vaginal birth after ), currently pregnant     (vaginal birth after )    41 weeks gestation of pregnancy    Anemia during pregnancy in third trimester

## 2025-06-03 NOTE — H&P
H & P- Obstetrics   Ida Browning 30 y.o. female MRN: [id number]  Unit/Bed#: -01 Encounter: [id number]    ObGyn Provider: [facility]   Assessment: 30 y.o.  at 41w2d (2025, by Ultrasound) admitted for  Elective Induction of Labor.  SVE: /-2  FHT Category 1  Clinical EFW:  2127  g (72%ile) @ 32w1d; Cephalic confirmed by BSUS  GBS: negative    Plan:   41 weeks gestation of pregnancy  Assessment & Plan  Admit to OBGYN   Clear liquid diet   F/u T&S, CBC, RPR   IVF LR 125cc/hr   Continuous fetal monitoring and tocometry   Analgesia at maternal request   Vertex by TAUS  Expectant Management          Discussed case and plan w/ Vivian Tobias MD    Chief Complaint: I am in labor  HPI: Ida Browning is a 30 y.o.  with an HERNÁN of 2025, by Ultrasound at 41w2d who is being admitted for Labor. She endorses Kingsley Olivares contractions, has no LOF, and reports no VB. She states she has felt good FM.    ROS  General: No fevers, chills or night sweats  Cardiovascular: No chest pain, or wheezing  HEENT: No visual changes  GI: No diarrhea, No blood in stools or black stools  : No dysuria or hematuria    Allergies[1]  OB History    Para Term  AB Living   4 3 3   3   SAB IAB Ectopic Multiple Live Births      0 3      # Outcome Date GA Lbr Matteo/2nd Weight Sex Type Anes PTL Lv   4 Current            3 Term 23 41w0d / 01:26 4315 g (9 lb 8.2 oz) M Vag-Spont EPI  [facility]   2 Term  40w5d  4394 g (9 lb 11 oz) M  EPI  [facility]   1 Term 2019 41w3d  4252 g (9 lb 6 oz) F CS-LTranv   [facility]      Complications: Failure to Progress in Second Stage      Obstetric Comments   : 2019 prim LTCS F FTP;   M;   M    Hgb electrophoresis WNL     Past Medical History[2]  Past Surgical History[3]  Tobacco Use History[4]    Medications Prior to Admission:     ferrous gluconate (FERGON) 324 mg tablet    Prenatal Multivit-Min-Fe-FA (PRE-HOLLIS PO)    Objective:  Temp:  [97.5 °F  "(36.4 °C)] 97.5 °F (36.4 °C)  There is no height or weight on file to calculate BMI.     General Physical Exam  General: The patient was alert and oriented x3, pleasant well-appearing female in no acute distress  Lungs: Non-labored breathing  Abdomen: Soft, Non-tender, Gravid  Lower extremities: Non-tender  Psych: Normal affect and judgement, cooperative    Labs:  Lab Results   Component Value Date    WBC 10.73 (H) 03/10/2025    HGB 10.5 (L) 03/10/2025    HCT 32.6 (L) 03/10/2025     03/10/2025     No results found for: \"NA\", \"K\", \"CL\", \"CO2\", \"BUN\", \"CREATININE\", \"GLUCOSE\", \"AST\", \"ALT\"    Prenatal Labs: Reviewed   Lab Results   Component Value Date    ABO A 2025    RH Positive 2025    ABS Negative 2025    STREPGRPB Negative 2025    SYPHILISAB Non-reactive 2025    HEPBSAG Non-reactive 2025    HEPCAB Non-reactive 2025    VARICELLAIGG NON-IMMUNE (A) 2023    [id number] 117 03/10/2025    RUBELLAIGGQT 28.3 2025    PRIMINTERP Negative for intraepithelial lesion or malignancy 2024      >2 Midnights  INPATIENT     Signature/Title:  Sivan Sage MD  Date: 6/3/2025  Time: 7:49 AM          [1] No Known Allergies  [2] No past medical history on file.  [3]   Past Surgical History:  Procedure Laterality Date     SECTION     [4]   Social History  Tobacco Use   Smoking Status Never   Smokeless Tobacco Never     "

## 2025-06-03 NOTE — ASSESSMENT & PLAN NOTE
Admit to OBGYN   Clear liquid diet   F/u T&S, CBC, RPR   IVF LR 125cc/hr   Continuous fetal monitoring and tocometry   Analgesia at maternal request   Vertex by TAUS  Expectant Management

## 2025-06-03 NOTE — OB LABOR/OXYTOCIN SAFETY PROGRESS
Oxytocin Safety Progress Check Note - Ida Browning 30 y.o. female MRN: [id number]    Unit/Bed#: -01 Encounter: [id number]    Dose (georgette-units/min) Oxytocin: 2 georgette-units/min  Contraction Frequency (minutes): 3-5  Contraction Intensity: Moderate  Uterine Activity Characteristics: Regular  Cervical Dilation: 8        Cervical Effacement: 80  Fetal Station: 0  Baseline Rate (FHR): 130 bpm  Fetal Heart Rate (FHT): 138 BPM  FHR Category: 2               Vital Signs:   Vitals:    06/03/25 1109   BP: 110/59   Pulse: 85   Temp:        Notes/comments:     Ida is having lates/prolonged 2-3 min deceleration. Pitocin discontinued. On SVE, she is unchanged. Her BP was noted to be 90s/50s, which anesthesia gave her ephedrine. Tracing improved to category I. D/W Dr. Tobias.     Nemo Covarrubias MD 6/3/2025 11:37 AM

## 2025-06-03 NOTE — OB LABOR/OXYTOCIN SAFETY PROGRESS
Labor Progress Note - Ida Browning 30 y.o. female MRN: [id number]    Unit/Bed#: -01 Encounter: [id number]       Contractions: 5-6 mins  Contraction Intensity: Moderate  Uterine Activity Characteristics: Regular  Cervical Dilation: 8  Cervical Effacement: 80  Fetal Station: 0  Baseline Rate (FHR): 130 bpm  Fetal Heart Rate (FHT): 138 BPM  FHR Category: 1       Vital Signs:   Vitals:    06/03/25 1000   BP: 106/56   Pulse:    Temp:        Notes/comments:   No cervical change from last exam- plan for pitocin augmentation given contractions are every 5-6 minutes and protracted dilation  Patient feels epidural is dense- will call anesthesia for eval.  Reassess in 1-2 hours or sooner if needed.     Vivian Tobias MD 6/3/2025 10:57 AM

## 2025-06-03 NOTE — ANESTHESIA POSTPROCEDURE EVALUATION
Post-Op Assessment Note    CV Status:  Stable  Pain Score: 0    Pain management: adequate      Post-op block assessment: catheter intact and no complications   Mental Status:  Awake   Hydration Status:  Stable   PONV Controlled:  None   Airway Patency:  Patent     Post Op Vitals Reviewed: Yes    No anethesia notable event occurred.    Staff: CRNA           Last Filed PACU Vitals:  Vitals Value Taken Time   Temp     Pulse 92 06/03/25 17:09   /59 06/03/25 17:09   Resp     SpO2     Vitals shown include unfiled device data.

## 2025-06-03 NOTE — OB LABOR/OXYTOCIN SAFETY PROGRESS
"Labor Progress Note - Ida Browning 30 y.o. female MRN: [id number]    Unit/Bed#: -01 Encounter: [id number]       Contraction Frequency (minutes): 3-5  Contraction Intensity: Moderate  Uterine Activity Characteristics: Regular  Cervical Dilation: 7  Cervical Effacement: 80  Fetal Station: -1  Baseline Rate (FHR): 130 bpm  FHR Category: 1     Vital Signs:   Vitals:    06/03/25 0719   Temp: 97.5 °F (36.4 °C)       Notes/comments:   Patient is now comfortable with epidural.  Excellent cervical change is noted.  We did review recommendations for amniotomy to which she declines at this time but would be amendable at next exam. Did encourage patient she is making good cervical change and tried to dispell concerns she will be \"on a clock\" if amniotomy is performed. She allowed me sweep membranes and will continue monitor closely.    Vivian Tobias MD 6/3/2025 9:39 AM      "

## 2025-06-03 NOTE — OB LABOR/OXYTOCIN SAFETY PROGRESS
Labor Progress Note - Ida Browning 30 y.o. female MRN: [id number]    Unit/Bed#: -01 Encounter: [id number]    Dose (georgette-units/min) Oxytocin: 2 georgette-units/min  Contraction Frequency (minutes): 3-5  Contraction Intensity: Moderate  Uterine Activity Characteristics: Regular  Cervical Dilation: 10  Dilation Complete Date: 06/03/25  Dilation Complete Time: 1535  Cervical Effacement: 100  Fetal Station: 3  Baseline Rate (FHR): 145 bpm  Fetal Heart Rate (FHT): 151 BPM  FHR Category: 1       Vital Signs:   Vitals:    06/03/25 1319   BP:    Pulse: 80   Temp:    SpO2: 99%       Notes/comments:   Patient comfortable with epidural-she is complete/+3 station.  Will begin pushing shortly.    Vivian Tobias MD 6/3/2025 1:39 PM

## 2025-06-03 NOTE — OB LABOR/OXYTOCIN SAFETY PROGRESS
Labor Progress Note - Ida Browning 30 y.o. female MRN: [id number]    Unit/Bed#: -01 Encounter: [id number]       Contraction Frequency (minutes): 3-5  Contraction Intensity: Moderate  Uterine Activity Characteristics: Regular  Cervical Dilation: 8  Cervical Effacement: 80  Fetal Station: 0  Baseline Rate (FHR): 130 bpm     FHR Category: 1     Vital Signs:   Vitals:    06/03/25 0719   Temp: 97.5 °F (36.4 °C)       Notes/comments:   Patient amenable to amniotomy which was performed at 09 45 for clear fluid.  Continue expectant management and reassess in 1 to 2 hours or sooner if needed.    Vivian Tobias MD 6/3/2025 9:47 AM

## 2025-06-03 NOTE — PLAN OF CARE
Problem: PAIN - ADULT  Goal: Verbalizes/displays adequate comfort level or baseline comfort level  Description: Interventions:  - Encourage patient to monitor pain and request assistance  - Assess pain using appropriate pain scale  - Administer analgesics as ordered based on type and severity of pain and evaluate response  - Implement non-pharmacological measures as appropriate and evaluate response  - Consider cultural and social influences on pain and pain management  - Notify physician/advanced practitioner if interventions unsuccessful or patient reports new pain  - Educate patient/family on pain management process including their role and importance of  reporting pain   - Provide non-pharmacologic/complimentary pain relief interventions  6/3/2025 1527 by Ariana Hopkins RN  Outcome: Progressing  6/3/2025 1526 by Ariana Hopkins RN  Outcome: Progressing     Problem: INFECTION - ADULT  Goal: Absence or prevention of progression during hospitalization  Description: INTERVENTIONS:  - Assess and monitor for signs and symptoms of infection  - Monitor lab/diagnostic results  - Monitor all insertion sites, i.e. indwelling lines, tubes, and drains  - Monitor endotracheal if appropriate and nasal secretions for changes in amount and color  - [place] appropriate cooling/warming therapies per order  - Administer medications as ordered  - Instruct and encourage patient and family to use good hand hygiene technique  - Identify and instruct in appropriate isolation precautions for identified infection/condition  6/3/2025 1527 by Ariana Hopkins RN  Outcome: Progressing  6/3/2025 1526 by Ariana Hopkins RN  Outcome: Progressing  Goal: Absence of fever/infection during neutropenic period  Description: INTERVENTIONS:  - Monitor WBC  - Perform strict hand hygiene  - Limit to healthy visitors only  - No plants, dried, fresh or silk flowers with dia in patient room  6/3/2025 1527 by Ariana Hopkins RN  Outcome:  Progressing  6/3/2025 1526 by Ariana Hopkins RN  Outcome: Progressing     Problem: SAFETY ADULT  Goal: Patient will remain free of falls  Description: INTERVENTIONS:  - Educate patient/family on patient safety including physical limitations  - Instruct patient to call for assistance with activity   - Consider consulting OT/PT to assist with strengthening/mobility based on AM PAC & JH-HLM score  - Consult OT/PT to assist with strengthening/mobility   - Keep Call bell within reach  - Keep bed low and locked with side rails adjusted as appropriate  - Keep care items and personal belongings within reach  - Initiate and maintain comfort rounds  - Make Fall Risk Sign visible to staff  - Offer Toileting every  Hours, in advance of need  - Initiate/Maintain alarm  - Obtain necessary fall risk management equipment:   - Apply yellow socks and bracelet for high fall risk patients  - Consider moving patient to room near nurses station  6/3/2025 1527 by Ariana Hopkins RN  Outcome: Progressing  6/3/2025 1526 by Ariana Hopkins RN  Outcome: Progressing  Goal: Maintain or return to baseline ADL function  Description: INTERVENTIONS:  -  Assess patient's ability to carry out ADLs; assess patient's baseline for ADL function and identify physical deficits which impact ability to perform ADLs (bathing, care of mouth/teeth, toileting, grooming, dressing, etc.)  - Assess/evaluate cause of self-care deficits   - Assess range of motion  - Assess patient's mobility; develop plan if impaired  - Assess patient's need for assistive devices and provide as appropriate  - Encourage maximum independence but intervene and supervise when necessary  - Involve family in performance of ADLs  - Assess for home care needs following discharge   - Consider OT consult to assist with ADL evaluation and planning for discharge  - Provide patient education as appropriate  - Monitor functional capacity and physical performance, use of AM PAC & JH-HLM   -  Monitor gait, balance and fatigue with ambulation    6/3/2025 1527 by Ariana Hopkins RN  Outcome: Progressing  6/3/2025 1526 by Ariana Hopkins RN  Outcome: Progressing  Goal: Maintains/Returns to pre admission functional level  Description: INTERVENTIONS:  - Perform AM-PAC 6 Click Basic Mobility/ Daily Activity assessment daily.  - Set and communicate daily mobility goal to care team and patient/family/caregiver.   - Collaborate with rehabilitation services on mobility goals if consulted  - Perform Range of Motion  times a day.  - Reposition patient every  hours.  - Dangle patient  times a day  - Stand patient  times a day  - Ambulate patient  times a day  - Out of bed to chair  times a day   - Out of bed for meals times a day  - Out of bed for toileting  - Record patient progress and toleration of activity level   6/3/2025 1527 by Ariana Hopkins RN  Outcome: Progressing  6/3/2025 1526 by Ariana Hopkins RN  Outcome: Progressing     Problem: DISCHARGE PLANNING  Goal: Discharge to home or other facility with appropriate resources  Description: INTERVENTIONS:  - Identify barriers to discharge w/patient and caregiver  - Arrange for needed discharge resources and transportation as appropriate  - Identify discharge learning needs (meds, wound care, etc.)  - Arrange for interpretive services to assist at discharge as needed  - Refer to Case Management Department for coordinating discharge planning if the patient needs post-hospital services based on physician/advanced practitioner order or complex needs related to functional status, cognitive ability, or social support system  6/3/2025 1527 by Ariana Hopkins RN  Outcome: Progressing  6/3/2025 1526 by Ariana Hopkins RN  Outcome: Progressing

## 2025-06-03 NOTE — ANESTHESIA PROCEDURE NOTES
Epidural Block    Patient location during procedure: OB/L&D  Start time: 6/3/2025 9:09 AM  Reason for block: procedure for pain  Staffing  Performed by: Theodora Olmstead CRNA  Authorized by: Nicola Dawkins MD    Preanesthetic Checklist  Completed: patient identified, IV checked, site marked, risks and benefits discussed, surgical consent, monitors and equipment checked, pre-op evaluation and timeout performed  Epidural  Patient position: sitting  Prep: ChloraPrep  Sedation Level: no sedation  Patient monitoring: frequent blood pressure checks, continuous pulse oximetry and heart rate  Approach: midline  Location: lumbar, L3-4  Injection technique: JACQUELIN saline  Needle  Needle type: Tuohy   Needle gauge: 18 G  Needle insertion depth: 6 cm  Catheter type: multi-orifice  Catheter size: 20 G  Catheter at skin depth: 12 cm  Catheter securement method: stabilization device and clear occlusive dressing  Test dose: negativelidocaine-epinephrine (XYLOCAINE-MPF/EPINEPHRINE) 1.5 %-1:200,000 injection 3 mL - Epidural   3 mL - 6/3/2025 9:09:00 AM  Assessment  Sensory level: T10  Number of attempts: 1negative aspiration for CSF, negative aspiration for heme and no paresthesia on injection  patient tolerated the procedure well with no immediate complications  Additional Notes  JACQUELIN @ 6cm; taped at 12cm

## 2025-06-03 NOTE — ANESTHESIA PREPROCEDURE EVALUATION
Procedure:  LABOR ANALGESIA    Relevant Problems   GYN   (+) 41 weeks gestation of pregnancy      HEMATOLOGY   (+) Anemia during pregnancy in third trimester        Physical Exam    Airway     Mallampati score: IV  TM Distance: >3 FB  Neck ROM: full      Cardiovascular  Cardiovascular exam normal    Dental       Pulmonary  Pulmonary exam normal     Neurological      Other Findings  post-pubertal.      Anesthesia Plan  ASA Score- 2     Anesthesia Type- epidural with ASA Monitors.         Additional Monitors:     Airway Plan:            Plan Factors-Exercise tolerance (METS): >4 METS.    Chart reviewed. EKG reviewed.  Existing labs reviewed. Patient summary reviewed.                  Induction-     Postoperative Plan- .   Monitoring Plan - Monitoring plan - standard ASA monitoring  Post Operative Pain Plan - epidural catheter    Perioperative Resuscitation Plan - Level 1 - Full Code.       Informed Consent- Anesthetic plan and risks discussed with patient and spouse.  I personally reviewed this patient with the CRNA. Discussed and agreed on the Anesthesia Plan with the CRNA..      NPO Status:  Vitals Value Taken Time   Date of last liquid 06/03/25 06/03/25 07:12   Time of last liquid 0715 06/03/25 07:12   Date of last solid 06/02/25 06/03/25 07:12   Time of last solid 2300 06/03/25 07:12

## 2025-06-04 VITALS
BODY MASS INDEX: 39.24 KG/M2 | WEIGHT: 250 LBS | HEART RATE: 86 BPM | HEIGHT: 67 IN | SYSTOLIC BLOOD PRESSURE: 110 MMHG | RESPIRATION RATE: 20 BRPM | TEMPERATURE: 97.7 F | DIASTOLIC BLOOD PRESSURE: 52 MMHG | OXYGEN SATURATION: 98 %

## 2025-06-04 LAB
C TRACH DNA SPEC QL NAA+PROBE: NEGATIVE
N GONORRHOEA DNA SPEC QL NAA+PROBE: NEGATIVE

## 2025-06-04 PROCEDURE — 99024 POSTOP FOLLOW-UP VISIT: CPT | Performed by: PHYSICIAN ASSISTANT

## 2025-06-04 RX ORDER — ACETAMINOPHEN 325 MG/1
650 TABLET ORAL EVERY 4 HOURS PRN
Qty: 30 TABLET | Refills: 0 | Status: SHIPPED | OUTPATIENT
Start: 2025-06-04

## 2025-06-04 RX ORDER — IBUPROFEN 600 MG/1
600 TABLET, FILM COATED ORAL EVERY 6 HOURS
Qty: 30 TABLET | Refills: 0 | Status: SHIPPED | OUTPATIENT
Start: 2025-06-05

## 2025-06-04 RX ADMIN — IBUPROFEN 600 MG: 600 TABLET, FILM COATED ORAL at 08:13

## 2025-06-04 RX ADMIN — DOCUSATE SODIUM 100 MG: 100 CAPSULE, LIQUID FILLED ORAL at 08:14

## 2025-06-04 RX ADMIN — IBUPROFEN 600 MG: 600 TABLET, FILM COATED ORAL at 15:44

## 2025-06-04 RX ADMIN — DOCUSATE SODIUM 100 MG: 100 CAPSULE, LIQUID FILLED ORAL at 18:34

## 2025-06-04 RX ADMIN — IBUPROFEN 600 MG: 600 TABLET, FILM COATED ORAL at 01:13

## 2025-06-04 NOTE — PROGRESS NOTES
Progress Note - OB/GYN   Name: Ida Browning 30 y.o. female I MRN: [id number]  Unit/Bed#: -01 I Date of Admission: 6/3/2025   Date of Service: 2025 I Hospital Day: 1     Assessment & Plan   (vaginal birth after )  Continue routine post partum care  Encourage ambulation  Encourage breastfeeding  Contraception: Undecided  Anticipate discharge PPD 1 vs 2       Ida Browning is a 30 y.o.  who is PPD 1 s/p  at 41w2d          Disposition: Anticipate discharge home postpartum Day #1-2  Barriers to discharge: none     Subjective/Objective     Subjective: Postpartum state    Pain: well-controlled  Tolerating PO: yes  Voiding: yes  Flatus: yes  BM: no  Ambulating: yes  Breastfeeding: Breastfeeding  Chest pain: no  Shortness of breath: no  Leg pain: no  Lochia: WNL    Objective:     Vitals:  Vitals:    25 2345 25 0355 25 0909   BP: 116/62 101/59 100/56 98/68   BP Location: Right arm Right arm Left arm    Pulse: 97 87 81 (!) 52   Resp: 20 18 18 20   Temp: 98.3 °F (36.8 °C) 97.5 °F (36.4 °C) 98.3 °F (36.8 °C) (!) 97.4 °F (36.3 °C)   TempSrc: Oral Oral Oral Oral   SpO2: 98% 98% 97%    Weight:       Height:           Physical Exam:   GEN: appears well, alert and oriented x 3, pleasant and cooperative   CV: Regular rate  RESP: non labored breathing  ABDOMEN: soft, no tenderness, no distention, Uterine fundus firm and non-tender, at the umbilicus  EXTREMITIES: non-tender  NEURO Alert and oriented to person, place, and time.       Lab Results   Component Value Date    WBC 15.75 (H) 2025    HGB 11.8 2025    HCT 36.1 2025    MCV 91 2025     2025         Jeane Abraham PA-C  Obstetrics & Gynecology  25

## 2025-06-04 NOTE — UTILIZATION REVIEW
"NOTIFICATION OF INPATIENT ADMISSION   MATERNITY/DELIVERY AUTHORIZATION REQUEST   SERVICING FACILITY:   [facility]  Parent Child Health - L&D, [place], NICU  [address] [place] [address]  Tax ID: [id number]  NPI: [id number]   ATTENDING PROVIDER:  Attending Name and NPI#: Vivian Tobias Md [[id number]]  Address: [address] [place] [address]  Phone: [phone]   ADMISSION INFORMATION:  Place of Service: Inpatient [facility]  Place of Service Code: 21  Inpatient Admission Date/Time: 6/3/25  7:17 AM  Discharge Date/Time: No discharge date for patient encounter.  Admitting Diagnosis Code/Description:  41 weeks gestation of pregnancy [O48.0, Z3A.41]  Uterine contractions [O47.9]  Encounter for full-term uncomplicated delivery [O80]     Mother: Ida Browning 1995 Estimated Date of Delivery: 25  Delivering clinician: Vivian Tobias   OB History          4    Para   4    Term   4            AB        Living   4         SAB        IAB        Ectopic        Multiple   0    Live Births   4           Obstetric Comments   : 2019 prim LTCS F FTP;   M;   M   Hgb electrophoresis WNL              Name & MRN:   Information for the patient's :  Nallely, Baby Boy (Ida) [[id number]]    Delivery Information:  Sex: male  Delivered 6/3/2025 1:48 PM by Vaginal, Spontaneous; Gestational Age: 41w2d     Measurements:  Weight: 10 lb 3.7 oz (4640 g);  Height: 21\"    APGAR 1 minute 5 minutes 10 minutes   Totals: 8 8       UTILIZATION REVIEW CONTACT:  Tiffany Morel, Utilization   Network Utilization Review Department  Phone: [phone]  Fax [phone]  Email: [email or web address]  Contact for approvals/pending authorizations, clinical reviews, and discharge.     PHYSICIAN ADVISORY SERVICES:  Medical Necessity Denial & Eyqg-jp-Bxag Review  Phone: [phone]  Fax: " [phone]  Email: [email or web address]     DISCHARGE SUPPORT TEAM:  For Patients Discharge Needs & Updates  Phone: [phone] opt. 2 Fax: [phone]  Email: [email or web address]

## 2025-06-04 NOTE — UTILIZATION REVIEW
"NOTIFICATION OF INPATIENT ADMISSION   MATERNITY/DELIVERY AUTHORIZATION REQUEST   SERVICING FACILITY:   [facility]  Parent Child Health - L&D, [place], NICU  [address] [place] [address]  Tax ID: [id number]  NPI: [id number]   ATTENDING PROVIDER:  Attending Name and NPI#: Vivian Tobias Md [[id number]]  Address: [address] [place] [address]  Phone: [phone]   ADMISSION INFORMATION:  Place of Service: Inpatient [facility]  Place of Service Code: 21  Inpatient Admission Date/Time: 6/3/25  7:17 AM  Discharge Date/Time: No discharge date for patient encounter.  Admitting Diagnosis Code/Description:  41 weeks gestation of pregnancy [O48.0, Z3A.41]  Uterine contractions [O47.9]  Encounter for full-term uncomplicated delivery [O80]     Mother: Ida Browning 1995 Estimated Date of Delivery: 25  Delivering clinician: Vivian Tobias   OB History          4    Para   4    Term   4            AB        Living   4         SAB        IAB        Ectopic        Multiple   0    Live Births   4           Obstetric Comments   : 2019 prim LTCS F FTP;   M;   M   Hgb electrophoresis WNL              Name & MRN:   Information for the patient's :  Nallely, Baby Boy (Ida) [[id number]]    Delivery Information:  Sex: male  Delivered 6/3/2025 1:48 PM by Vaginal, Spontaneous; Gestational Age: 41w2d     Measurements:  Weight: 10 lb 3.7 oz (4640 g);  Height: 21\"    APGAR 1 minute 5 minutes 10 minutes   Totals: 8 8       UTILIZATION REVIEW CONTACT:  Tiffany Morel, Utilization   Network Utilization Review Department  Phone: [phone]  Fax [phone]  Email: [email or web address]  Contact for approvals/pending authorizations, clinical reviews, and discharge.     PHYSICIAN ADVISORY SERVICES:  Medical Necessity Denial & Hxwz-vb-Rmzi Review  Phone: [phone]  Fax: " [phone]  Email: [email or web address]     DISCHARGE SUPPORT TEAM:  For Patients Discharge Needs & Updates  Phone: [phone] opt. 2 Fax: [phone]  Email: [email or web address]

## 2025-06-04 NOTE — PLAN OF CARE
Problem: PAIN - ADULT  Goal: Verbalizes/displays adequate comfort level or baseline comfort level  Description: Interventions:  - Encourage patient to monitor pain and request assistance  - Assess pain using appropriate pain scale  - Administer analgesics as ordered based on type and severity of pain and evaluate response  - Implement non-pharmacological measures as appropriate and evaluate response  - Consider cultural and social influences on pain and pain management  - Notify physician/advanced practitioner if interventions unsuccessful or patient reports new pain  - Educate patient/family on pain management process including their role and importance of  reporting pain   - Provide non-pharmacologic/complimentary pain relief interventions  Outcome: Progressing     Problem: INFECTION - ADULT  Goal: Absence or prevention of progression during hospitalization  Description: INTERVENTIONS:  - Assess and monitor for signs and symptoms of infection  - Monitor lab/diagnostic results  - Monitor all insertion sites, i.e. indwelling lines, tubes, and drains  - Monitor endotracheal if appropriate and nasal secretions for changes in amount and color  - [place] appropriate cooling/warming therapies per order  - Administer medications as ordered  - Instruct and encourage patient and family to use good hand hygiene technique  - Identify and instruct in appropriate isolation precautions for identified infection/condition  Outcome: Progressing  Goal: Absence of fever/infection during neutropenic period  Description: INTERVENTIONS:  - Monitor WBC  - Perform strict hand hygiene  - Limit to healthy visitors only  - No plants, dried, fresh or silk flowers with dia in patient room  Outcome: Progressing     Problem: SAFETY ADULT  Goal: Patient will remain free of falls  Description: INTERVENTIONS:  - Educate patient/family on patient safety including physical limitations  - Instruct patient to call for assistance with activity   -  Consider consulting OT/PT to assist with strengthening/mobility based on AM PAC & JH-HLM score  - Consult OT/PT to assist with strengthening/mobility   - Keep Call bell within reach  - Keep bed low and locked with side rails adjusted as appropriate  - Keep care items and personal belongings within reach  - Initiate and maintain comfort rounds  - Make Fall Risk Sign visible to staff  - Offer Toileting every  Hours, in advance of need  - Initiate/Maintain alarm  - Obtain necessary fall risk management equipment:   - Apply yellow socks and bracelet for high fall risk patients  - Consider moving patient to room near nurses station  Outcome: Progressing  Goal: Maintain or return to baseline ADL function  Description: INTERVENTIONS:  -  Assess patient's ability to carry out ADLs; assess patient's baseline for ADL function and identify physical deficits which impact ability to perform ADLs (bathing, care of mouth/teeth, toileting, grooming, dressing, etc.)  - Assess/evaluate cause of self-care deficits   - Assess range of motion  - Assess patient's mobility; develop plan if impaired  - Assess patient's need for assistive devices and provide as appropriate  - Encourage maximum independence but intervene and supervise when necessary  - Involve family in performance of ADLs  - Assess for home care needs following discharge   - Consider OT consult to assist with ADL evaluation and planning for discharge  - Provide patient education as appropriate  - Monitor functional capacity and physical performance, use of AM PAC & JH-HLM   - Monitor gait, balance and fatigue with ambulation    Outcome: Progressing  Goal: Maintains/Returns to pre admission functional level  Description: INTERVENTIONS:  - Perform AM-PAC 6 Click Basic Mobility/ Daily Activity assessment daily.  - Set and communicate daily mobility goal to care team and patient/family/caregiver.   - Collaborate with rehabilitation services on mobility goals if consulted  -  Perform Range of Motion  times a day.  - Reposition patient every  hours.  - Dangle patient  times a day  - Stand patient  times a day  - Ambulate patient  times a day  - Out of bed to chair  times a day   - Out of bed for meals  times a day  - Out of bed for toileting  - Record patient progress and toleration of activity level   Outcome: Progressing     Problem: DISCHARGE PLANNING  Goal: Discharge to home or other facility with appropriate resources  Description: INTERVENTIONS:  - Identify barriers to discharge w/patient and caregiver  - Arrange for needed discharge resources and transportation as appropriate  - Identify discharge learning needs (meds, wound care, etc.)  - Arrange for interpretive services to assist at discharge as needed  - Refer to Case Management Department for coordinating discharge planning if the patient needs post-hospital services based on physician/advanced practitioner order or complex needs related to functional status, cognitive ability, or social support system  Outcome: Progressing     Problem: POSTPARTUM  Goal: Experiences normal postpartum course  Description: INTERVENTIONS:  - Monitor maternal vital signs  - Assess uterine involution and lochia  Outcome: Progressing  Goal: Appropriate maternal -  bonding  Description: INTERVENTIONS:  - Identify family support  - Assess for appropriate maternal/infant bonding   -Encourage maternal/infant bonding opportunities  - Referral to  or  as needed  Outcome: Progressing  Goal: Establishment of infant feeding pattern  Description: INTERVENTIONS:  - Assess breast/bottle feeding  - Refer to lactation as needed  Outcome: Progressing  Goal: Incision(s), wounds(s) or drain site(s) healing without S/S of infection  Description: INTERVENTIONS  - Assess and document dressing, incision, wound bed, drain sites and surrounding tissue  - Provide patient and family education  - Perform skin care/dressing changes every    Outcome: Progressing

## 2025-06-04 NOTE — PLAN OF CARE
Problem: PAIN - ADULT  Goal: Verbalizes/displays adequate comfort level or baseline comfort level  Description: Interventions:  - Encourage patient to monitor pain and request assistance  - Assess pain using appropriate pain scale  - Administer analgesics as ordered based on type and severity of pain and evaluate response  - Implement non-pharmacological measures as appropriate and evaluate response  - Consider cultural and social influences on pain and pain management  - Notify physician/advanced practitioner if interventions unsuccessful or patient reports new pain  - Educate patient/family on pain management process including their role and importance of  reporting pain   - Provide non-pharmacologic/complimentary pain relief interventions  Outcome: Progressing     Problem: INFECTION - ADULT  Goal: Absence or prevention of progression during hospitalization  Description: INTERVENTIONS:  - Assess and monitor for signs and symptoms of infection  - Monitor lab/diagnostic results  - Monitor all insertion sites, i.e. indwelling lines, tubes, and drains  - Monitor endotracheal if appropriate and nasal secretions for changes in amount and color  - [place] appropriate cooling/warming therapies per order  - Administer medications as ordered  - Instruct and encourage patient and family to use good hand hygiene technique  - Identify and instruct in appropriate isolation precautions for identified infection/condition  Outcome: Progressing  Goal: Absence of fever/infection during neutropenic period  Description: INTERVENTIONS:  - Monitor WBC  - Perform strict hand hygiene  - Limit to healthy visitors only  - No plants, dried, fresh or silk flowers with dia in patient room  Outcome: Progressing     Problem: SAFETY ADULT  Goal: Patient will remain free of falls  Description: INTERVENTIONS:  - Educate patient/family on patient safety including physical limitations  - Instruct patient to call for assistance with activity   -  Consider consulting OT/PT to assist with strengthening/mobility based on AM PAC & JH-HLM score  - Consult OT/PT to assist with strengthening/mobility   - Keep Call bell within reach  - Keep bed low and locked with side rails adjusted as appropriate  - Keep care items and personal belongings within reach  - Initiate and maintain comfort rounds  - Make Fall Risk Sign visible to staff  - Apply yellow socks and bracelet for high fall risk patients  - Consider moving patient to room near nurses station  Outcome: Progressing  Goal: Maintain or return to baseline ADL function  Description: INTERVENTIONS:  -  Assess patient's ability to carry out ADLs; assess patient's baseline for ADL function and identify physical deficits which impact ability to perform ADLs (bathing, care of mouth/teeth, toileting, grooming, dressing, etc.)  - Assess/evaluate cause of self-care deficits   - Assess range of motion  - Assess patient's mobility; develop plan if impaired  - Assess patient's need for assistive devices and provide as appropriate  - Encourage maximum independence but intervene and supervise when necessary  - Involve family in performance of ADLs  - Assess for home care needs following discharge   - Consider OT consult to assist with ADL evaluation and planning for discharge  - Provide patient education as appropriate  - Monitor functional capacity and physical performance, use of AM PAC & JH-HLM   - Monitor gait, balance and fatigue with ambulation    Outcome: Progressing  Goal: Maintains/Returns to pre admission functional level  Description: INTERVENTIONS:  - Perform AM-PAC 6 Click Basic Mobility/ Daily Activity assessment daily.  - Set and communicate daily mobility goal to care team and patient/family/caregiver.   - Collaborate with rehabilitation services on mobility goals if consulted  - Out of bed for toileting  - Record patient progress and toleration of activity level   Outcome: Progressing     Problem: DISCHARGE  PLANNING  Goal: Discharge to home or other facility with appropriate resources  Description: INTERVENTIONS:  - Identify barriers to discharge w/patient and caregiver  - Arrange for needed discharge resources and transportation as appropriate  - Identify discharge learning needs (meds, wound care, etc.)  - Arrange for interpretive services to assist at discharge as needed  - Refer to Case Management Department for coordinating discharge planning if the patient needs post-hospital services based on physician/advanced practitioner order or complex needs related to functional status, cognitive ability, or social support system  Outcome: Progressing     Problem: POSTPARTUM  Goal: Experiences normal postpartum course  Description: INTERVENTIONS:  - Monitor maternal vital signs  - Assess uterine involution and lochia  Outcome: Progressing  Goal: Appropriate maternal -  bonding  Description: INTERVENTIONS:  - Identify family support  - Assess for appropriate maternal/infant bonding   -Encourage maternal/infant bonding opportunities  - Referral to  or  as needed  Outcome: Progressing  Goal: Establishment of infant feeding pattern  Description: INTERVENTIONS:  - Assess breast/bottle feeding  - Refer to lactation as needed  Outcome: Progressing  Goal: Incision(s), wounds(s) or drain site(s) healing without S/S of infection  Description: INTERVENTIONS  - Assess and document dressing, incision, wound bed, drain sites and surrounding tissue  - Provide patient and family education  Outcome: Progressing

## 2025-06-05 NOTE — PLAN OF CARE
Problem: PAIN - ADULT  Goal: Verbalizes/displays adequate comfort level or baseline comfort level  Description: Interventions:  - Encourage patient to monitor pain and request assistance  - Assess pain using appropriate pain scale  - Administer analgesics as ordered based on type and severity of pain and evaluate response  - Implement non-pharmacological measures as appropriate and evaluate response  - Consider cultural and social influences on pain and pain management  - Notify physician/advanced practitioner if interventions unsuccessful or patient reports new pain  - Educate patient/family on pain management process including their role and importance of  reporting pain   - Provide non-pharmacologic/complimentary pain relief interventions  Outcome: Adequate for Discharge     Problem: INFECTION - ADULT  Goal: Absence or prevention of progression during hospitalization  Description: INTERVENTIONS:  - Assess and monitor for signs and symptoms of infection  - Monitor lab/diagnostic results  - Monitor all insertion sites, i.e. indwelling lines, tubes, and drains  - Monitor endotracheal if appropriate and nasal secretions for changes in amount and color  - [place] appropriate cooling/warming therapies per order  - Administer medications as ordered  - Instruct and encourage patient and family to use good hand hygiene technique  - Identify and instruct in appropriate isolation precautions for identified infection/condition  Outcome: Adequate for Discharge  Goal: Absence of fever/infection during neutropenic period  Description: INTERVENTIONS:  - Monitor WBC  - Perform strict hand hygiene  - Limit to healthy visitors only  - No plants, dried, fresh or silk flowers with dia in patient room  Outcome: Adequate for Discharge     Problem: SAFETY ADULT  Goal: Patient will remain free of falls  Description: INTERVENTIONS:  - Educate patient/family on patient safety including physical limitations  - Instruct patient to call  for assistance with activity   - Consider consulting OT/PT to assist with strengthening/mobility based on AM PAC & JH-HLM score  - Consult OT/PT to assist with strengthening/mobility   - Keep Call bell within reach  - Keep bed low and locked with side rails adjusted as appropriate  - Keep care items and personal belongings within reach  - Initiate and maintain comfort rounds  - Make Fall Risk Sign visible to staff  - Offer Toileting every  Hours, in advance of need  - Initiate/Maintain alarm  - Obtain necessary fall risk management equipment:   - Apply yellow socks and bracelet for high fall risk patients  - Consider moving patient to room near nurses station  Outcome: Adequate for Discharge  Goal: Maintain or return to baseline ADL function  Description: INTERVENTIONS:  -  Assess patient's ability to carry out ADLs; assess patient's baseline for ADL function and identify physical deficits which impact ability to perform ADLs (bathing, care of mouth/teeth, toileting, grooming, dressing, etc.)  - Assess/evaluate cause of self-care deficits   - Assess range of motion  - Assess patient's mobility; develop plan if impaired  - Assess patient's need for assistive devices and provide as appropriate  - Encourage maximum independence but intervene and supervise when necessary  - Involve family in performance of ADLs  - Assess for home care needs following discharge   - Consider OT consult to assist with ADL evaluation and planning for discharge  - Provide patient education as appropriate  - Monitor functional capacity and physical performance, use of AM PAC & JH-HLM   - Monitor gait, balance and fatigue with ambulation    Outcome: Adequate for Discharge  Goal: Maintains/Returns to pre admission functional level  Description: INTERVENTIONS:  - Perform AM-PAC 6 Click Basic Mobility/ Daily Activity assessment daily.  - Set and communicate daily mobility goal to care team and patient/family/caregiver.   - Collaborate with  rehabilitation services on mobility goals if consulted  - Perform Range of Motion  times a day.  - Reposition patient every  hours.  - Dangle patient  times a day  - Stand patient  times a day  - Ambulate patient  times a day  - Out of bed to chair  times a day   - Out of bed for meals  times a day  - Out of bed for toileting  - Record patient progress and toleration of activity level   Outcome: Adequate for Discharge     Problem: DISCHARGE PLANNING  Goal: Discharge to home or other facility with appropriate resources  Description: INTERVENTIONS:  - Identify barriers to discharge w/patient and caregiver  - Arrange for needed discharge resources and transportation as appropriate  - Identify discharge learning needs (meds, wound care, etc.)  - Arrange for interpretive services to assist at discharge as needed  - Refer to Case Management Department for coordinating discharge planning if the patient needs post-hospital services based on physician/advanced practitioner order or complex needs related to functional status, cognitive ability, or social support system  Outcome: Adequate for Discharge     Problem: POSTPARTUM  Goal: Experiences normal postpartum course  Description: INTERVENTIONS:  - Monitor maternal vital signs  - Assess uterine involution and lochia  Outcome: Adequate for Discharge  Goal: Appropriate maternal -  bonding  Description: INTERVENTIONS:  - Identify family support  - Assess for appropriate maternal/infant bonding   -Encourage maternal/infant bonding opportunities  - Referral to  or  as needed  Outcome: Adequate for Discharge  Goal: Establishment of infant feeding pattern  Description: INTERVENTIONS:  - Assess breast/bottle feeding  - Refer to lactation as needed  Outcome: Adequate for Discharge  Goal: Incision(s), wounds(s) or drain site(s) healing without S/S of infection  Description: INTERVENTIONS  - Assess and document dressing, incision, wound bed, drain sites  and surrounding tissue  - Provide patient and family education  - Perform skin care/dressing changes every   Outcome: Adequate for Discharge

## 2025-06-05 NOTE — UTILIZATION REVIEW
NOTIFICATION OF ADMISSION DISCHARGE   This is a Notification of Discharge from [facility]. Please be advised that this patient has been discharge from our facility. Below you will find the admission and discharge date and time including the patient’s disposition.   UTILIZATION REVIEW CONTACT:  Utilization Review Assistants  Network Utilization Review Department  Phone: [phone] x carefully listen to the prompts. All voicemails are confidential.  Email: [email or web address]     ADMISSION INFORMATION  PRESENTATION DATE: 6/3/2025  6:51 AM  OBERVATION ADMISSION DATE: N/A  INPATIENT ADMISSION DATE: 6/3/25  7:17 AM   DISCHARGE DATE: 6/4/2025  9:30 PM   DISPOSITION:Home/Self Care    Network Utilization Review Department  ATTENTION: Please call with any questions or concerns to [phone] and carefully listen to the prompts so that you are directed to the right person. All voicemails are confidential.   For Discharge needs, contact Care Management DC Support Team at [phone] opt. 2  Send all requests for admission clinical reviews, approved or denied determinations and any other requests to dedicated fax number below belonging to the campus where the patient is receiving treatment. List of dedicated fax numbers for the Facilities:  FACILITY NAME UR FAX NUMBER   ADMISSION DENIALS (Administrative/Medical Necessity) [phone]   DISCHARGE SUPPORT TEAM ([facility]) [phone]   PARENT CHILD HEALTH (Maternity/NICU/Pediatrics) [phone]   [facility] [phone]   [facility] [phone]   [facility] [phone]   [facility] [phone]   [facility] [phone]   [facility] [phone]   [facility] [phone]   [facility] [phone]   [facility]  [facility] [phone]   [facility] [phone]   General acute hospital [phone]   [facility] [phone]

## 2025-06-09 LAB — PLACENTA IN STORAGE: NORMAL

## 2025-06-19 ENCOUNTER — TELEPHONE (OUTPATIENT)
Age: 30
End: 2025-06-19

## 2025-06-19 NOTE — TELEPHONE ENCOUNTER
Received call from Patient for New Patient - Skin Check - SOC = moles, family history of SCC, Melanoma. Scheduled 1/16/26 1:00 pm [facility]. Verified insurance, provided CV addr.     Patient asked for Wait List, informed her she may call back for cancellations/sooner appt.    Patient verbalized understanding.